# Patient Record
Sex: FEMALE | Race: WHITE | URBAN - METROPOLITAN AREA
[De-identification: names, ages, dates, MRNs, and addresses within clinical notes are randomized per-mention and may not be internally consistent; named-entity substitution may affect disease eponyms.]

---

## 2021-12-08 ENCOUNTER — APPOINTMENT (RX ONLY)
Dept: URBAN - METROPOLITAN AREA CLINIC 316 | Facility: CLINIC | Age: 76
Setting detail: DERMATOLOGY
End: 2021-12-08

## 2021-12-08 ENCOUNTER — RX ONLY (OUTPATIENT)
Age: 76
Setting detail: RX ONLY
End: 2021-12-08

## 2021-12-08 DIAGNOSIS — D485 NEOPLASM OF UNCERTAIN BEHAVIOR OF SKIN: ICD-10-CM | Status: WORSENING

## 2021-12-08 DIAGNOSIS — L57.8 OTHER SKIN CHANGES DUE TO CHRONIC EXPOSURE TO NONIONIZING RADIATION: ICD-10-CM | Status: WORSENING

## 2021-12-08 DIAGNOSIS — D22 MELANOCYTIC NEVI: ICD-10-CM

## 2021-12-08 DIAGNOSIS — L57.0 ACTINIC KERATOSIS: ICD-10-CM | Status: WORSENING

## 2021-12-08 DIAGNOSIS — L81.4 OTHER MELANIN HYPERPIGMENTATION: ICD-10-CM

## 2021-12-08 PROBLEM — D22.5 MELANOCYTIC NEVI OF TRUNK: Status: ACTIVE | Noted: 2021-12-08

## 2021-12-08 PROBLEM — D22.61 MELANOCYTIC NEVI OF RIGHT UPPER LIMB, INCLUDING SHOULDER: Status: ACTIVE | Noted: 2021-12-08

## 2021-12-08 PROBLEM — D48.5 NEOPLASM OF UNCERTAIN BEHAVIOR OF SKIN: Status: ACTIVE | Noted: 2021-12-08

## 2021-12-08 PROCEDURE — 99204 OFFICE O/P NEW MOD 45 MIN: CPT | Mod: 25

## 2021-12-08 PROCEDURE — ? BIOPSY BY SHAVE METHOD

## 2021-12-08 PROCEDURE — ? PRESCRIPTION

## 2021-12-08 PROCEDURE — ? FULL BODY SKIN EXAM - DECLINED

## 2021-12-08 PROCEDURE — ? LIQUID NITROGEN

## 2021-12-08 PROCEDURE — ? SCREENING FOR COVID-19

## 2021-12-08 PROCEDURE — ? COUNSELING

## 2021-12-08 PROCEDURE — ? SUNSCREEN RECOMMENDATIONS

## 2021-12-08 PROCEDURE — 17004 DESTROY PREMAL LESIONS 15/>: CPT

## 2021-12-08 PROCEDURE — 11102 TANGNTL BX SKIN SINGLE LES: CPT | Mod: 59

## 2021-12-08 RX ORDER — FLUOROURACIL 2 G/40G
5% CREAM TOPICAL BID
Qty: 40 | Refills: 3 | Status: ERX | COMMUNITY
Start: 2021-12-08

## 2021-12-08 RX ORDER — FLUOROURACIL 5 MG/G
5% CREAM TOPICAL BID
Qty: 40 | Refills: 2 | Status: CANCELLED | COMMUNITY
Start: 2021-12-08

## 2021-12-08 RX ADMIN — FLUOROURACIL 5%: 5 CREAM TOPICAL at 00:00

## 2021-12-08 ASSESSMENT — LOCATION DETAILED DESCRIPTION DERM
LOCATION DETAILED: RIGHT LATERAL DORSAL WRIST
LOCATION DETAILED: NASAL SUPRATIP
LOCATION DETAILED: RIGHT VENTRAL LATERAL DISTAL FOREARM
LOCATION DETAILED: LEFT PROXIMAL POSTERIOR UPPER ARM
LOCATION DETAILED: RIGHT PROXIMAL LATERAL DORSAL FOREARM
LOCATION DETAILED: LEFT DISTAL POSTERIOR UPPER ARM
LOCATION DETAILED: LEFT RADIAL DORSAL HAND
LOCATION DETAILED: RIGHT PROXIMAL DORSAL FOREARM
LOCATION DETAILED: UPPER STERNUM
LOCATION DETAILED: RIGHT CENTRAL MALAR CHEEK
LOCATION DETAILED: LEFT PROXIMAL DORSAL FOREARM
LOCATION DETAILED: LEFT CENTRAL MALAR CHEEK
LOCATION DETAILED: RIGHT PROXIMAL LATERAL POSTERIOR UPPER ARM
LOCATION DETAILED: RIGHT MEDIAL FOREHEAD
LOCATION DETAILED: RIGHT RADIAL DORSAL HAND
LOCATION DETAILED: RIGHT DISTAL DORSAL FOREARM
LOCATION DETAILED: 2ND WEB SPACE LEFT HAND
LOCATION DETAILED: LEFT ULNAR DORSAL HAND
LOCATION DETAILED: RIGHT DORSAL MIDDLE FINGER METACARPOPHALANGEAL JOINT
LOCATION DETAILED: RIGHT PROXIMAL POSTERIOR UPPER ARM

## 2021-12-08 ASSESSMENT — LOCATION ZONE DERM
LOCATION ZONE: FACE
LOCATION ZONE: NOSE
LOCATION ZONE: TRUNK
LOCATION ZONE: HAND
LOCATION ZONE: ARM

## 2021-12-08 ASSESSMENT — LOCATION SIMPLE DESCRIPTION DERM
LOCATION SIMPLE: RIGHT FOREHEAD
LOCATION SIMPLE: RIGHT WRIST
LOCATION SIMPLE: LEFT POSTERIOR UPPER ARM
LOCATION SIMPLE: LEFT FOREARM
LOCATION SIMPLE: NOSE
LOCATION SIMPLE: RIGHT CHEEK
LOCATION SIMPLE: RIGHT HAND
LOCATION SIMPLE: LEFT HAND
LOCATION SIMPLE: RIGHT POSTERIOR UPPER ARM
LOCATION SIMPLE: CHEST
LOCATION SIMPLE: LEFT CHEEK
LOCATION SIMPLE: RIGHT FOREARM

## 2021-12-08 NOTE — PROCEDURE: SUNSCREEN RECOMMENDATIONS
Products Recommended: Elta MD UV Clear
Detail Level: Detailed
General Sunscreen Counseling: I recommended a broad spectrum sunscreen with a SPF of 30 or higher.  I explained that SPF 30 sunscreens block approximately 97 percent of the sun's harmful rays.  Sunscreens should be applied at least 15 minutes prior to expected sun exposure and then every 2 hours after that as long as sun exposure continues. If swimming or exercising sunscreen should be reapplied every 45 minutes to an hour after getting wet or sweating.  One ounce, or the equivalent of a shot glass full of sunscreen, is adequate to protect the skin not covered by a bathing suit. I also recommended a lip balm with a sunscreen as well. Sun protective clothing can be used in lieu of sunscreen but must be worn the entire time you are exposed to the sun's rays.

## 2021-12-08 NOTE — PROCEDURE: BIOPSY BY SHAVE METHOD
Detail Level: Detailed
Depth Of Biopsy: dermis
Was A Bandage Applied: Yes
Size Of Lesion In Cm: 0.7
X Size Of Lesion In Cm: 0.8
Biopsy Type: H and E
Biopsy Method: Dermablade
Anesthesia Type: 2% lidocaine with epinephrine
Additional Anesthesia Volume In Cc (Will Not Render If 0): 0
Hemostasis: Electrocautery and Aluminum Chloride
Wound Care: Bacitracin
Dressing: bandage
Destruction After The Procedure: No
Type Of Destruction Used: Curettage
Curettage Text: The wound bed was treated with curettage after the biopsy was performed.
Cryotherapy Text: The wound bed was treated with cryotherapy after the biopsy was performed.
Electrodesiccation Text: The wound bed was treated with electrodesiccation after the biopsy was performed.
Electrodesiccation And Curettage Text: The wound bed was treated with electrodesiccation and curettage after the biopsy was performed.
Silver Nitrate Text: The wound bed was treated with silver nitrate after the biopsy was performed.
Lab: 6
Lab Facility: 3
Triangulation (Location Of Lesion In Relation To Distance From Anatomical Landmarks): 6.5 cm distal and 2cm med to R epicondyle
Consent: Verbal consent was obtained and risks were reviewed including but not limited to scarring, infection, bleeding, scabbing, incomplete removal, nerve damage and allergy to anesthesia.
Post-Care Instructions: I reviewed with the patient in detail post-care instructions. Patient is to keep the biopsy site dry overnight, and then apply bacitracin twice daily until healed. Patient may apply hydrogen peroxide soaks to remove any crusting.
Notification Instructions: Patient will be notified of biopsy results. However, patient instructed to call the office if not contacted within 2 weeks.
Billing Type: Third-Party Bill
Information: Selecting Yes will display possible errors in your note based on the variables you have selected. This validation is only offered as a suggestion for you. PLEASE NOTE THAT THE VALIDATION TEXT WILL BE REMOVED WHEN YOU FINALIZE YOUR NOTE. IF YOU WANT TO FAX A PRELIMINARY NOTE YOU WILL NEED TO TOGGLE THIS TO 'NO' IF YOU DO NOT WANT IT IN YOUR FAXED NOTE.

## 2021-12-08 NOTE — PROCEDURE: LIQUID NITROGEN
Detail Level: Detailed
Duration Of Freeze Thaw-Cycle (Seconds): 2
Post-Care Instructions: I reviewed with the patient in detail post-care instructions. Patient is to wear sunprotection, and avoid picking at any of the treated lesions. Pt may apply Vaseline to crusted or scabbing areas.
Consent: The patient's consent was obtained including but not limited to risks of crusting, scabbing, blistering, scarring, darker or lighter pigmentary change, recurrence, incomplete removal and infection.
Show Aperture Variable?: Yes
Render Note In Bullet Format When Appropriate: No
Number Of Freeze-Thaw Cycles: 2 freeze-thaw cycles

## 2021-12-08 NOTE — PROCEDURE: MIPS QUALITY
Quality 130: Documentation Of Current Medications In The Medical Record: Current Medications Documented
Detail Level: Detailed
Quality 47: Advance Care Plan: Advance Care Planning discussed and documented; advance care plan or surrogate decision maker documented in the medical record.
Quality 226: Preventive Care And Screening: Tobacco Use: Screening And Cessation Intervention: Patient screened for tobacco use and is an ex/non-smoker
Quality 111:Pneumonia Vaccination Status For Older Adults: Pneumococcal Vaccination not Administered or Previously Received, Reason not Otherwise Specified
Quality 431: Preventive Care And Screening: Unhealthy Alcohol Use - Screening: Patient screened for unhealthy alcohol use using a single question and scores less than 2 times per year
Quality 110: Preventive Care And Screening: Influenza Immunization: Influenza Immunization previously received during influenza season

## 2021-12-30 ENCOUNTER — APPOINTMENT (RX ONLY)
Dept: URBAN - METROPOLITAN AREA CLINIC 316 | Facility: CLINIC | Age: 76
Setting detail: DERMATOLOGY
End: 2021-12-30

## 2021-12-30 PROBLEM — D04.61 CARCINOMA IN SITU OF SKIN OF RIGHT UPPER LIMB, INCLUDING SHOULDER: Status: ACTIVE | Noted: 2021-12-30

## 2021-12-30 PROCEDURE — ? CURETTAGE AND DESTRUCTION

## 2021-12-30 PROCEDURE — 17262 DSTRJ MAL LES T/A/L 1.1-2.0: CPT

## 2021-12-30 PROCEDURE — ? SCREENING FOR COVID-19

## 2021-12-30 NOTE — PROCEDURE: MIPS QUALITY
Quality 130: Documentation Of Current Medications In The Medical Record: Current Medications Documented
Quality 110: Preventive Care And Screening: Influenza Immunization: Influenza Immunization previously received during influenza season
Quality 431: Preventive Care And Screening: Unhealthy Alcohol Use - Screening: Patient screened for unhealthy alcohol use using a single question and scores less than 2 times per year
Quality 226: Preventive Care And Screening: Tobacco Use: Screening And Cessation Intervention: Patient screened for tobacco use and is an ex/non-smoker
Quality 47: Advance Care Plan: Advance Care Planning discussed and documented; advance care plan or surrogate decision maker documented in the medical record.
Detail Level: Detailed
Quality 111:Pneumonia Vaccination Status For Older Adults: Pneumococcal Vaccination Previously Received

## 2022-02-11 ENCOUNTER — APPOINTMENT (RX ONLY)
Dept: URBAN - METROPOLITAN AREA CLINIC 316 | Facility: CLINIC | Age: 77
Setting detail: DERMATOLOGY
End: 2022-02-11

## 2022-02-11 DIAGNOSIS — Z48.817 ENCOUNTER FOR SURGICAL AFTERCARE FOLLOWING SURGERY ON THE SKIN AND SUBCUTANEOUS TISSUE: ICD-10-CM

## 2022-02-11 DIAGNOSIS — D22 MELANOCYTIC NEVI: ICD-10-CM

## 2022-02-11 DIAGNOSIS — L82.1 OTHER SEBORRHEIC KERATOSIS: ICD-10-CM

## 2022-02-11 DIAGNOSIS — L57.0 ACTINIC KERATOSIS: ICD-10-CM | Status: INADEQUATELY CONTROLLED

## 2022-02-11 PROBLEM — D22.5 MELANOCYTIC NEVI OF TRUNK: Status: ACTIVE | Noted: 2022-02-11

## 2022-02-11 PROCEDURE — ? ADDITIONAL NOTES

## 2022-02-11 PROCEDURE — ? SCREENING FOR COVID-19

## 2022-02-11 PROCEDURE — ? POST-OP WOUND CHECK

## 2022-02-11 PROCEDURE — 17000 DESTRUCT PREMALG LESION: CPT

## 2022-02-11 PROCEDURE — ? LIQUID NITROGEN

## 2022-02-11 PROCEDURE — 99213 OFFICE O/P EST LOW 20 MIN: CPT | Mod: 25

## 2022-02-11 PROCEDURE — 17003 DESTRUCT PREMALG LES 2-14: CPT

## 2022-02-11 PROCEDURE — 99024 POSTOP FOLLOW-UP VISIT: CPT

## 2022-02-11 PROCEDURE — ? COUNSELING

## 2022-02-11 PROCEDURE — ? NOTED ON EXAM BUT NOT TREATED

## 2022-02-11 ASSESSMENT — LOCATION ZONE DERM
LOCATION ZONE: ARM
LOCATION ZONE: HAND
LOCATION ZONE: TRUNK

## 2022-02-11 ASSESSMENT — LOCATION SIMPLE DESCRIPTION DERM
LOCATION SIMPLE: LEFT HAND
LOCATION SIMPLE: RIGHT UPPER BACK
LOCATION SIMPLE: RIGHT POSTERIOR UPPER ARM
LOCATION SIMPLE: LEFT UPPER BACK
LOCATION SIMPLE: RIGHT HAND
LOCATION SIMPLE: LEFT FOREARM
LOCATION SIMPLE: RIGHT FOREARM

## 2022-02-11 ASSESSMENT — LOCATION DETAILED DESCRIPTION DERM
LOCATION DETAILED: RIGHT DISTAL DORSAL FOREARM
LOCATION DETAILED: LEFT SUPERIOR UPPER BACK
LOCATION DETAILED: LEFT MID-UPPER BACK
LOCATION DETAILED: LEFT RADIAL DORSAL HAND
LOCATION DETAILED: LEFT DISTAL RADIAL DORSAL FOREARM
LOCATION DETAILED: LEFT DORSAL INDEX METACARPOPHALANGEAL JOINT
LOCATION DETAILED: LEFT DISTAL DORSAL FOREARM
LOCATION DETAILED: LEFT PROXIMAL RADIAL DORSAL FOREARM
LOCATION DETAILED: LEFT PROXIMAL DORSAL FOREARM
LOCATION DETAILED: RIGHT DISTAL POSTERIOR UPPER ARM
LOCATION DETAILED: RIGHT DISTAL RADIAL DORSAL FOREARM
LOCATION DETAILED: RIGHT RADIAL DORSAL HAND
LOCATION DETAILED: RIGHT PROXIMAL LATERAL DORSAL FOREARM
LOCATION DETAILED: RIGHT MID-UPPER BACK

## 2022-02-11 NOTE — PROCEDURE: NOTED ON EXAM BUT NOT TREATED
Detail Level: Zone
Text: The above diagnosis and findings were noted on the exam but patient does not want to treat at this time.

## 2022-02-11 NOTE — PROCEDURE: LIQUID NITROGEN
Post-Care Instructions: I reviewed with the patient in detail post-care instructions. Patient is to wear sunprotection, and avoid picking at any of the treated lesions. Pt may apply Vaseline to crusted or scabbing areas.
Consent: The patient's consent was obtained including but not limited to risks of crusting, scabbing, blistering, scarring, darker or lighter pigmentary change, recurrence, incomplete removal and infection.
Render Post-Care Instructions In Note?: yes
Duration Of Freeze Thaw-Cycle (Seconds): 2
Detail Level: Detailed
Number Of Freeze-Thaw Cycles: 2 freeze-thaw cycles
Render Note In Bullet Format When Appropriate: No

## 2022-02-11 NOTE — PROCEDURE: POST-OP WOUND CHECK
Detail Level: Detailed
Add 86723 Cpt? (Important Note: In 2017 The Use Of 34930 Is Being Tracked By Cms To Determine Future Global Period Reimbursement For Global Periods): yes
Wound Evaluated By: JAMES [email or web address]-C

## 2024-01-01 ENCOUNTER — HOSPITAL ENCOUNTER (OUTPATIENT)
Dept: RADIOLOGY | Facility: HOSPITAL | Age: 79
Discharge: HOME | End: 2024-01-23
Payer: MEDICARE

## 2024-01-01 ENCOUNTER — ANESTHESIA EVENT (OUTPATIENT)
Dept: GASTROENTEROLOGY | Facility: HOSPITAL | Age: 79
DRG: 166 | End: 2024-01-01
Payer: MEDICARE

## 2024-01-01 ENCOUNTER — APPOINTMENT (OUTPATIENT)
Dept: CARDIOLOGY | Facility: CLINIC | Age: 79
End: 2024-01-01
Payer: MEDICARE

## 2024-01-01 ENCOUNTER — HOSPITAL ENCOUNTER (OUTPATIENT)
Dept: RADIOLOGY | Facility: CLINIC | Age: 79
Discharge: HOME | End: 2024-02-05
Payer: MEDICARE

## 2024-01-01 ENCOUNTER — APPOINTMENT (OUTPATIENT)
Dept: RADIOLOGY | Facility: HOSPITAL | Age: 79
DRG: 166 | End: 2024-01-01
Payer: MEDICARE

## 2024-01-01 ENCOUNTER — HOSPITAL ENCOUNTER (OUTPATIENT)
Dept: CARDIOLOGY | Facility: HOSPITAL | Age: 79
Discharge: HOME | End: 2024-01-12
Payer: MEDICARE

## 2024-01-01 ENCOUNTER — APPOINTMENT (OUTPATIENT)
Dept: CARDIOLOGY | Facility: HOSPITAL | Age: 79
DRG: 166 | End: 2024-01-01
Payer: MEDICARE

## 2024-01-01 ENCOUNTER — OFFICE VISIT (OUTPATIENT)
Dept: HEMATOLOGY/ONCOLOGY | Facility: CLINIC | Age: 79
End: 2024-01-01
Payer: MEDICARE

## 2024-01-01 ENCOUNTER — APPOINTMENT (OUTPATIENT)
Dept: HEMATOLOGY/ONCOLOGY | Facility: CLINIC | Age: 79
End: 2024-01-01
Payer: MEDICARE

## 2024-01-01 ENCOUNTER — APPOINTMENT (OUTPATIENT)
Dept: GASTROENTEROLOGY | Facility: HOSPITAL | Age: 79
DRG: 166 | End: 2024-01-01
Payer: MEDICARE

## 2024-01-01 ENCOUNTER — ANESTHESIA (OUTPATIENT)
Dept: GASTROENTEROLOGY | Facility: HOSPITAL | Age: 79
DRG: 166 | End: 2024-01-01
Payer: MEDICARE

## 2024-01-01 ENCOUNTER — HOSPITAL ENCOUNTER (INPATIENT)
Facility: HOSPITAL | Age: 79
LOS: 3 days | Discharge: HOME | DRG: 166 | End: 2024-01-11
Attending: GENERAL PRACTICE | Admitting: STUDENT IN AN ORGANIZED HEALTH CARE EDUCATION/TRAINING PROGRAM
Payer: MEDICARE

## 2024-01-01 ENCOUNTER — TELEPHONE (OUTPATIENT)
Dept: HEMATOLOGY/ONCOLOGY | Facility: CLINIC | Age: 79
End: 2024-01-01
Payer: MEDICARE

## 2024-01-01 ENCOUNTER — SOCIAL WORK (OUTPATIENT)
Dept: HEMATOLOGY/ONCOLOGY | Facility: CLINIC | Age: 79
End: 2024-01-01
Payer: MEDICARE

## 2024-01-01 VITALS
HEART RATE: 74 BPM | WEIGHT: 148.15 LBS | DIASTOLIC BLOOD PRESSURE: 70 MMHG | RESPIRATION RATE: 18 BRPM | SYSTOLIC BLOOD PRESSURE: 137 MMHG | BODY MASS INDEX: 24.92 KG/M2 | OXYGEN SATURATION: 94 % | TEMPERATURE: 97.7 F

## 2024-01-01 VITALS
DIASTOLIC BLOOD PRESSURE: 61 MMHG | BODY MASS INDEX: 24.75 KG/M2 | HEIGHT: 66 IN | TEMPERATURE: 99 F | SYSTOLIC BLOOD PRESSURE: 128 MMHG | HEART RATE: 65 BPM | WEIGHT: 154 LBS | RESPIRATION RATE: 20 BRPM | OXYGEN SATURATION: 94 %

## 2024-01-01 VITALS
HEIGHT: 65 IN | SYSTOLIC BLOOD PRESSURE: 137 MMHG | HEART RATE: 68 BPM | WEIGHT: 150.35 LBS | TEMPERATURE: 98.1 F | BODY MASS INDEX: 25.05 KG/M2 | DIASTOLIC BLOOD PRESSURE: 79 MMHG | OXYGEN SATURATION: 95 % | RESPIRATION RATE: 18 BRPM

## 2024-01-01 VITALS
BODY MASS INDEX: 24.43 KG/M2 | WEIGHT: 152 LBS | RESPIRATION RATE: 18 BRPM | DIASTOLIC BLOOD PRESSURE: 59 MMHG | TEMPERATURE: 97.3 F | OXYGEN SATURATION: 96 % | HEIGHT: 66 IN | HEART RATE: 50 BPM | SYSTOLIC BLOOD PRESSURE: 105 MMHG

## 2024-01-01 DIAGNOSIS — C34.12 MALIGNANT NEOPLASM OF UPPER LOBE OF LEFT LUNG (MULTI): ICD-10-CM

## 2024-01-01 DIAGNOSIS — C79.70 MALIGNANT NEOPLASM METASTATIC TO ADRENAL GLAND, UNSPECIFIED LATERALITY (MULTI): ICD-10-CM

## 2024-01-01 DIAGNOSIS — C34.12 MALIGNANT NEOPLASM OF UPPER LOBE OF LEFT LUNG (MULTI): Primary | ICD-10-CM

## 2024-01-01 DIAGNOSIS — R91.8 LUNG MASS: ICD-10-CM

## 2024-01-01 DIAGNOSIS — C34.90 SMALL CELL LUNG CANCER (MULTI): Primary | ICD-10-CM

## 2024-01-01 DIAGNOSIS — R00.1 BRADYCARDIA: ICD-10-CM

## 2024-01-01 DIAGNOSIS — I10 BENIGN ESSENTIAL HYPERTENSION: ICD-10-CM

## 2024-01-01 DIAGNOSIS — R91.8 MASS OF LEFT LUNG: Primary | ICD-10-CM

## 2024-01-01 DIAGNOSIS — R55 SYNCOPE AND COLLAPSE: ICD-10-CM

## 2024-01-01 DIAGNOSIS — E78.2 MIXED HYPERLIPIDEMIA: ICD-10-CM

## 2024-01-01 DIAGNOSIS — R91.8 MASS OF LEFT LUNG: ICD-10-CM

## 2024-01-01 DIAGNOSIS — C34.90 SMALL CELL LUNG CANCER (MULTI): ICD-10-CM

## 2024-01-01 LAB
ALBUMIN SERPL BCP-MCNC: 3.4 G/DL (ref 3.4–5)
ALBUMIN SERPL BCP-MCNC: 3.5 G/DL (ref 3.4–5)
ALBUMIN SERPL BCP-MCNC: 3.6 G/DL (ref 3.4–5)
ALBUMIN SERPL BCP-MCNC: 3.6 G/DL (ref 3.4–5)
ALP SERPL-CCNC: 94 U/L (ref 33–136)
ALT SERPL W P-5'-P-CCNC: 8 U/L (ref 7–45)
ANION GAP SERPL CALC-SCNC: 10 MMOL/L (ref 10–20)
ANION GAP SERPL CALC-SCNC: 12 MMOL/L (ref 10–20)
ANION GAP SERPL CALC-SCNC: 12 MMOL/L (ref 10–20)
ANION GAP SERPL CALC-SCNC: 9 MMOL/L (ref 10–20)
AORTIC VALVE PEAK VELOCITY: 1.41
APAP SERPL-MCNC: <10 UG/ML
APPEARANCE UR: ABNORMAL
AST SERPL W P-5'-P-CCNC: 14 U/L (ref 9–39)
ATRIAL RATE: 55 BPM
ATRIAL RATE: 59 BPM
AV PEAK GRADIENT: 8
AVA (PEAK VEL): 3.19
BACTERIA #/AREA URNS AUTO: ABNORMAL /HPF
BACTERIA UR CULT: ABNORMAL
BASOPHILS # BLD AUTO: 0.01 X10*3/UL (ref 0–0.1)
BASOPHILS # BLD AUTO: 0.04 X10*3/UL (ref 0–0.1)
BASOPHILS # BLD AUTO: 0.05 X10*3/UL (ref 0–0.1)
BASOPHILS NFR BLD AUTO: 0.2 %
BASOPHILS NFR BLD AUTO: 0.5 %
BASOPHILS NFR BLD AUTO: 0.7 %
BILIRUB SERPL-MCNC: 0.5 MG/DL (ref 0–1.2)
BILIRUB UR STRIP.AUTO-MCNC: NEGATIVE MG/DL
BUN SERPL-MCNC: 10 MG/DL (ref 6–23)
BUN SERPL-MCNC: 11 MG/DL (ref 6–23)
CALCIUM SERPL-MCNC: 8.2 MG/DL (ref 8.6–10.3)
CALCIUM SERPL-MCNC: 8.4 MG/DL (ref 8.6–10.3)
CALCIUM SERPL-MCNC: 8.6 MG/DL (ref 8.6–10.3)
CALCIUM SERPL-MCNC: 8.7 MG/DL (ref 8.6–10.3)
CARDIAC TROPONIN I PNL SERPL HS: 4 NG/L (ref 0–13)
CARDIAC TROPONIN I PNL SERPL HS: 4 NG/L (ref 0–13)
CARDIAC TROPONIN I PNL SERPL HS: 5 NG/L (ref 0–13)
CHLORIDE SERPL-SCNC: 104 MMOL/L (ref 98–107)
CHLORIDE SERPL-SCNC: 106 MMOL/L (ref 98–107)
CHLORIDE SERPL-SCNC: 107 MMOL/L (ref 98–107)
CHLORIDE SERPL-SCNC: 110 MMOL/L (ref 98–107)
CO2 SERPL-SCNC: 25 MMOL/L (ref 21–32)
CO2 SERPL-SCNC: 28 MMOL/L (ref 21–32)
CO2 SERPL-SCNC: 28 MMOL/L (ref 21–32)
CO2 SERPL-SCNC: 31 MMOL/L (ref 21–32)
COLOR UR: YELLOW
CORTIS AM PEAK SERPL-MSCNC: 15.2 UG/DL (ref 5–20)
CORTIS AM PEAK SERPL-MSCNC: 17.4 UG/DL (ref 5–20)
CREAT SERPL-MCNC: 0.74 MG/DL (ref 0.5–1.05)
CREAT SERPL-MCNC: 0.78 MG/DL (ref 0.5–1.05)
CREAT SERPL-MCNC: 0.79 MG/DL (ref 0.5–1.05)
CREAT SERPL-MCNC: 0.86 MG/DL (ref 0.5–1.05)
CREAT UR-MCNC: 144.1 MG/DL (ref 20–320)
D DIMER PPP FEU-MCNC: 1247 NG/ML FEU
EGFRCR SERPLBLD CKD-EPI 2021: 70 ML/MIN/1.73M*2
EGFRCR SERPLBLD CKD-EPI 2021: 78 ML/MIN/1.73M*2
EGFRCR SERPLBLD CKD-EPI 2021: 79 ML/MIN/1.73M*2
EGFRCR SERPLBLD CKD-EPI 2021: 84 ML/MIN/1.73M*2
EJECTION FRACTION APICAL 4 CHAMBER: 74
EJECTION FRACTION: 69
EOSINOPHIL # BLD AUTO: 0 X10*3/UL (ref 0–0.4)
EOSINOPHIL # BLD AUTO: 0.18 X10*3/UL (ref 0–0.4)
EOSINOPHIL # BLD AUTO: 0.19 X10*3/UL (ref 0–0.4)
EOSINOPHIL NFR BLD AUTO: 0 %
EOSINOPHIL NFR BLD AUTO: 2.5 %
EOSINOPHIL NFR BLD AUTO: 2.7 %
ERYTHROCYTE [DISTWIDTH] IN BLOOD BY AUTOMATED COUNT: 14 % (ref 11.5–14.5)
ERYTHROCYTE [DISTWIDTH] IN BLOOD BY AUTOMATED COUNT: 14.1 % (ref 11.5–14.5)
ERYTHROCYTE [DISTWIDTH] IN BLOOD BY AUTOMATED COUNT: 14.4 % (ref 11.5–14.5)
ERYTHROCYTE [DISTWIDTH] IN BLOOD BY AUTOMATED COUNT: 14.4 % (ref 11.5–14.5)
ETHANOL SERPL-MCNC: <10 MG/DL
GLUCOSE SERPL-MCNC: 101 MG/DL (ref 74–99)
GLUCOSE SERPL-MCNC: 117 MG/DL (ref 74–99)
GLUCOSE SERPL-MCNC: 89 MG/DL (ref 74–99)
GLUCOSE SERPL-MCNC: 89 MG/DL (ref 74–99)
GLUCOSE UR STRIP.AUTO-MCNC: NEGATIVE MG/DL
HCT VFR BLD AUTO: 36.7 % (ref 36–46)
HCT VFR BLD AUTO: 37.6 % (ref 36–46)
HCT VFR BLD AUTO: 40.4 % (ref 36–46)
HCT VFR BLD AUTO: 40.6 % (ref 36–46)
HGB BLD-MCNC: 11.2 G/DL (ref 12–16)
HGB BLD-MCNC: 11.7 G/DL (ref 12–16)
HGB BLD-MCNC: 12.2 G/DL (ref 12–16)
HGB BLD-MCNC: 12.4 G/DL (ref 12–16)
HOLD SPECIMEN: NORMAL
IMM GRANULOCYTES # BLD AUTO: 0.03 X10*3/UL (ref 0–0.5)
IMM GRANULOCYTES # BLD AUTO: 0.03 X10*3/UL (ref 0–0.5)
IMM GRANULOCYTES # BLD AUTO: 0.05 X10*3/UL (ref 0–0.5)
IMM GRANULOCYTES NFR BLD AUTO: 0.4 % (ref 0–0.9)
IMM GRANULOCYTES NFR BLD AUTO: 0.5 % (ref 0–0.9)
IMM GRANULOCYTES NFR BLD AUTO: 0.7 % (ref 0–0.9)
INR PPP: 1 (ref 0.9–1.1)
INR PPP: 1.1 (ref 0.9–1.1)
KETONES UR STRIP.AUTO-MCNC: NEGATIVE MG/DL
LAB AP ASR DISCLAIMER: NORMAL
LABORATORY COMMENT REPORT: NORMAL
LEFT VENTRICLE INTERNAL DIMENSION DIASTOLE: 4 (ref 3.5–6)
LEFT VENTRICULAR OUTFLOW TRACT DIAMETER: 2.1
LEUKOCYTE ESTERASE UR QL STRIP.AUTO: ABNORMAL
LYMPHOCYTES # BLD AUTO: 0.97 X10*3/UL (ref 0.8–3)
LYMPHOCYTES # BLD AUTO: 1.55 X10*3/UL (ref 0.8–3)
LYMPHOCYTES # BLD AUTO: 1.58 X10*3/UL (ref 0.8–3)
LYMPHOCYTES NFR BLD AUTO: 14.7 %
LYMPHOCYTES NFR BLD AUTO: 20.6 %
LYMPHOCYTES NFR BLD AUTO: 22.8 %
MAGNESIUM SERPL-MCNC: 2.05 MG/DL (ref 1.6–2.4)
MAGNESIUM SERPL-MCNC: 2.07 MG/DL (ref 1.6–2.4)
MAGNESIUM SERPL-MCNC: 2.09 MG/DL (ref 1.6–2.4)
MCH RBC QN AUTO: 28 PG (ref 26–34)
MCH RBC QN AUTO: 28.1 PG (ref 26–34)
MCH RBC QN AUTO: 28.6 PG (ref 26–34)
MCH RBC QN AUTO: 28.7 PG (ref 26–34)
MCHC RBC AUTO-ENTMCNC: 30 G/DL (ref 32–36)
MCHC RBC AUTO-ENTMCNC: 30.5 G/DL (ref 32–36)
MCHC RBC AUTO-ENTMCNC: 30.7 G/DL (ref 32–36)
MCHC RBC AUTO-ENTMCNC: 31.1 G/DL (ref 32–36)
MCV RBC AUTO: 92 FL (ref 80–100)
MCV RBC AUTO: 96 FL (ref 80–100)
MITRAL VALVE E/A RATIO: 1.45
MITRAL VALVE E/E' RATIO: 15.47
MONOCYTES # BLD AUTO: 0.64 X10*3/UL (ref 0.05–0.8)
MONOCYTES # BLD AUTO: 0.65 X10*3/UL (ref 0.05–0.8)
MONOCYTES # BLD AUTO: 0.7 X10*3/UL (ref 0.05–0.8)
MONOCYTES NFR BLD AUTO: 9.1 %
MONOCYTES NFR BLD AUTO: 9.4 %
MONOCYTES NFR BLD AUTO: 9.9 %
NEUTROPHILS # BLD AUTO: 4.34 X10*3/UL (ref 1.6–5.5)
NEUTROPHILS # BLD AUTO: 4.93 X10*3/UL (ref 1.6–5.5)
NEUTROPHILS # BLD AUTO: 5.1 X10*3/UL (ref 1.6–5.5)
NEUTROPHILS NFR BLD AUTO: 64 %
NEUTROPHILS NFR BLD AUTO: 66.6 %
NEUTROPHILS NFR BLD AUTO: 74.7 %
NITRITE UR QL STRIP.AUTO: POSITIVE
NRBC BLD-RTO: 0 /100 WBCS (ref 0–0)
OSMOLALITY SERPL: 301 MOSM/KG (ref 280–300)
OSMOLALITY UR: 683 MOSM/KG (ref 200–1200)
P AXIS: 35 DEGREES
P AXIS: 43 DEGREES
P OFFSET: 209 MS
P OFFSET: 212 MS
P ONSET: 155 MS
P ONSET: 159 MS
PATH REPORT.COMMENTS IMP SPEC: NORMAL
PATH REPORT.COMMENTS IMP SPEC: NORMAL
PATH REPORT.FINAL DX SPEC: NORMAL
PATH REPORT.GROSS SPEC: NORMAL
PATH REPORT.RELEVANT HX SPEC: NORMAL
PATH REPORT.TOTAL CANCER: NORMAL
PH UR STRIP.AUTO: 6 [PH]
PHOSPHATE SERPL-MCNC: 3.4 MG/DL (ref 2.5–4.9)
PHOSPHATE SERPL-MCNC: 3.7 MG/DL (ref 2.5–4.9)
PHOSPHATE SERPL-MCNC: 3.8 MG/DL (ref 2.5–4.9)
PLATELET # BLD AUTO: 193 X10*3/UL (ref 150–450)
PLATELET # BLD AUTO: 194 X10*3/UL (ref 150–450)
PLATELET # BLD AUTO: 212 X10*3/UL (ref 150–450)
PLATELET # BLD AUTO: 230 X10*3/UL (ref 150–450)
POTASSIUM SERPL-SCNC: 3.5 MMOL/L (ref 3.5–5.3)
POTASSIUM SERPL-SCNC: 3.5 MMOL/L (ref 3.5–5.3)
POTASSIUM SERPL-SCNC: 3.7 MMOL/L (ref 3.5–5.3)
POTASSIUM SERPL-SCNC: 3.8 MMOL/L (ref 3.5–5.3)
PR INTERVAL: 126 MS
PR INTERVAL: 130 MS
PROT SERPL-MCNC: 6.3 G/DL (ref 6.4–8.2)
PROT UR STRIP.AUTO-MCNC: ABNORMAL MG/DL
PROTHROMBIN TIME: 11.3 SECONDS (ref 9.8–12.8)
PROTHROMBIN TIME: 12.2 SECONDS (ref 9.8–12.8)
Q ONSET: 220 MS
Q ONSET: 222 MS
QRS COUNT: 9 BEATS
QRS COUNT: 9 BEATS
QRS DURATION: 74 MS
QRS DURATION: 88 MS
QT INTERVAL: 464 MS
QT INTERVAL: 476 MS
QTC CALCULATION(BAZETT): 455 MS
QTC CALCULATION(BAZETT): 459 MS
QTC FREDERICIA: 461 MS
QTC FREDERICIA: 462 MS
R AXIS: 40 DEGREES
R AXIS: 45 DEGREES
RBC # BLD AUTO: 4 X10*6/UL (ref 4–5.2)
RBC # BLD AUTO: 4.09 X10*6/UL (ref 4–5.2)
RBC # BLD AUTO: 4.25 X10*6/UL (ref 4–5.2)
RBC # BLD AUTO: 4.41 X10*6/UL (ref 4–5.2)
RBC # UR STRIP.AUTO: NEGATIVE /UL
RBC #/AREA URNS AUTO: ABNORMAL /HPF
RIGHT VENTRICLE FREE WALL PEAK S': 11.3
RIGHT VENTRICLE PEAK SYSTOLIC PRESSURE: 26.8
SALICYLATES SERPL-MCNC: <3 MG/DL
SODIUM SERPL-SCNC: 140 MMOL/L (ref 136–145)
SODIUM SERPL-SCNC: 140 MMOL/L (ref 136–145)
SODIUM SERPL-SCNC: 143 MMOL/L (ref 136–145)
SODIUM SERPL-SCNC: 143 MMOL/L (ref 136–145)
SODIUM UR-SCNC: 115 MMOL/L
SODIUM/CREAT UR-RTO: 80 MMOL/G CREAT
SP GR UR STRIP.AUTO: 1.05
SQUAMOUS #/AREA URNS AUTO: ABNORMAL /HPF
T AXIS: -2 DEGREES
T AXIS: 17 DEGREES
T OFFSET: 454 MS
T OFFSET: 458 MS
TRICUSPID ANNULAR PLANE SYSTOLIC EXCURSION: 1.8
TSH SERPL-ACNC: 0.88 MIU/L (ref 0.44–3.98)
UROBILINOGEN UR STRIP.AUTO-MCNC: <2 MG/DL
VENTRICULAR RATE: 55 BPM
VENTRICULAR RATE: 59 BPM
WBC # BLD AUTO: 6.6 X10*3/UL (ref 4.4–11.3)
WBC # BLD AUTO: 6.8 X10*3/UL (ref 4.4–11.3)
WBC # BLD AUTO: 7.5 X10*3/UL (ref 4.4–11.3)
WBC # BLD AUTO: 7.7 X10*3/UL (ref 4.4–11.3)
WBC #/AREA URNS AUTO: ABNORMAL /HPF

## 2024-01-01 PROCEDURE — 99223 1ST HOSP IP/OBS HIGH 75: CPT | Performed by: INTERNAL MEDICINE

## 2024-01-01 PROCEDURE — 36415 COLL VENOUS BLD VENIPUNCTURE: CPT | Performed by: RADIOLOGY

## 2024-01-01 PROCEDURE — 97161 PT EVAL LOW COMPLEX 20 MIN: CPT | Mod: GP | Performed by: PHYSICAL THERAPIST

## 2024-01-01 PROCEDURE — 84484 ASSAY OF TROPONIN QUANT: CPT

## 2024-01-01 PROCEDURE — 1126F AMNT PAIN NOTED NONE PRSNT: CPT | Performed by: INTERNAL MEDICINE

## 2024-01-01 PROCEDURE — 2550000001 HC RX 255 CONTRASTS

## 2024-01-01 PROCEDURE — A31625 PR BRONCHOSCOPY,BIOPSY: Performed by: STUDENT IN AN ORGANIZED HEALTH CARE EDUCATION/TRAINING PROGRAM

## 2024-01-01 PROCEDURE — 0B9G8ZX DRAINAGE OF LEFT UPPER LUNG LOBE, VIA NATURAL OR ARTIFICIAL OPENING ENDOSCOPIC, DIAGNOSTIC: ICD-10-PCS | Performed by: INTERNAL MEDICINE

## 2024-01-01 PROCEDURE — 2550000001 HC RX 255 CONTRASTS: Performed by: GENERAL PRACTICE

## 2024-01-01 PROCEDURE — 72125 CT NECK SPINE W/O DYE: CPT

## 2024-01-01 PROCEDURE — 88112 CYTOPATH CELL ENHANCE TECH: CPT | Performed by: PATHOLOGY

## 2024-01-01 PROCEDURE — 99205 OFFICE O/P NEW HI 60 MIN: CPT | Performed by: INTERNAL MEDICINE

## 2024-01-01 PROCEDURE — 88305 TISSUE EXAM BY PATHOLOGIST: CPT | Mod: TC,SUR,STJLAB | Performed by: STUDENT IN AN ORGANIZED HEALTH CARE EDUCATION/TRAINING PROGRAM

## 2024-01-01 PROCEDURE — 2500000004 HC RX 250 GENERAL PHARMACY W/ HCPCS (ALT 636 FOR OP/ED)

## 2024-01-01 PROCEDURE — 93005 ELECTROCARDIOGRAM TRACING: CPT

## 2024-01-01 PROCEDURE — 80069 RENAL FUNCTION PANEL: CPT

## 2024-01-01 PROCEDURE — 99222 1ST HOSP IP/OBS MODERATE 55: CPT | Performed by: INTERNAL MEDICINE

## 2024-01-01 PROCEDURE — 1111F DSCHRG MED/CURRENT MED MERGE: CPT | Performed by: INTERNAL MEDICINE

## 2024-01-01 PROCEDURE — A9575 INJ GADOTERATE MEGLUMI 0.1ML: HCPCS

## 2024-01-01 PROCEDURE — 99285 EMERGENCY DEPT VISIT HI MDM: CPT | Mod: 25 | Performed by: GENERAL PRACTICE

## 2024-01-01 PROCEDURE — 88305 TISSUE EXAM BY PATHOLOGIST: CPT | Mod: TC,SUR,STJLAB | Performed by: INTERNAL MEDICINE

## 2024-01-01 PROCEDURE — 2500000001 HC RX 250 WO HCPCS SELF ADMINISTERED DRUGS (ALT 637 FOR MEDICARE OP)

## 2024-01-01 PROCEDURE — 7100000001 HC RECOVERY ROOM TIME - INITIAL BASE CHARGE

## 2024-01-01 PROCEDURE — 74177 CT ABD & PELVIS W/CONTRAST: CPT

## 2024-01-01 PROCEDURE — 83935 ASSAY OF URINE OSMOLALITY: CPT | Mod: STJLAB

## 2024-01-01 PROCEDURE — 7100000002 HC RECOVERY ROOM TIME - EACH INCREMENTAL 1 MINUTE

## 2024-01-01 PROCEDURE — 3700000002 HC GENERAL ANESTHESIA TIME - EACH INCREMENTAL 1 MINUTE

## 2024-01-01 PROCEDURE — 83735 ASSAY OF MAGNESIUM: CPT

## 2024-01-01 PROCEDURE — 36415 COLL VENOUS BLD VENIPUNCTURE: CPT

## 2024-01-01 PROCEDURE — 2500000004 HC RX 250 GENERAL PHARMACY W/ HCPCS (ALT 636 FOR OP/ED): Performed by: RADIOLOGY

## 2024-01-01 PROCEDURE — 84443 ASSAY THYROID STIM HORMONE: CPT | Performed by: STUDENT IN AN ORGANIZED HEALTH CARE EDUCATION/TRAINING PROGRAM

## 2024-01-01 PROCEDURE — 71045 X-RAY EXAM CHEST 1 VIEW: CPT | Performed by: RADIOLOGY

## 2024-01-01 PROCEDURE — 99223 1ST HOSP IP/OBS HIGH 75: CPT | Performed by: STUDENT IN AN ORGANIZED HEALTH CARE EDUCATION/TRAINING PROGRAM

## 2024-01-01 PROCEDURE — 49180 BIOPSY ABDOMINAL MASS: CPT | Performed by: RADIOLOGY

## 2024-01-01 PROCEDURE — 99232 SBSQ HOSP IP/OBS MODERATE 35: CPT | Performed by: INTERNAL MEDICINE

## 2024-01-01 PROCEDURE — 88112 CYTOPATH CELL ENHANCE TECH: CPT | Mod: TC | Performed by: INTERNAL MEDICINE

## 2024-01-01 PROCEDURE — 71275 CT ANGIOGRAPHY CHEST: CPT

## 2024-01-01 PROCEDURE — 71046 X-RAY EXAM CHEST 2 VIEWS: CPT | Performed by: RADIOLOGY

## 2024-01-01 PROCEDURE — 85025 COMPLETE CBC W/AUTO DIFF WBC: CPT

## 2024-01-01 PROCEDURE — 77012 CT SCAN FOR NEEDLE BIOPSY: CPT | Performed by: RADIOLOGY

## 2024-01-01 PROCEDURE — 1157F ADVNC CARE PLAN IN RCRD: CPT | Performed by: INTERNAL MEDICINE

## 2024-01-01 PROCEDURE — 82570 ASSAY OF URINE CREATININE: CPT

## 2024-01-01 PROCEDURE — 2550000001 HC RX 255 CONTRASTS: Performed by: STUDENT IN AN ORGANIZED HEALTH CARE EDUCATION/TRAINING PROGRAM

## 2024-01-01 PROCEDURE — 70553 MRI BRAIN STEM W/O & W/DYE: CPT

## 2024-01-01 PROCEDURE — 1200000002 HC GENERAL ROOM WITH TELEMETRY DAILY

## 2024-01-01 PROCEDURE — 82533 TOTAL CORTISOL: CPT | Mod: STJLAB | Performed by: STUDENT IN AN ORGANIZED HEALTH CARE EDUCATION/TRAINING PROGRAM

## 2024-01-01 PROCEDURE — G0390 TRAUMA RESPONS W/HOSP CRITI: HCPCS

## 2024-01-01 PROCEDURE — 88341 IMHCHEM/IMCYTCHM EA ADD ANTB: CPT | Performed by: STUDENT IN AN ORGANIZED HEALTH CARE EDUCATION/TRAINING PROGRAM

## 2024-01-01 PROCEDURE — 1159F MED LIST DOCD IN RCRD: CPT | Performed by: INTERNAL MEDICINE

## 2024-01-01 PROCEDURE — 3430000001 HC RX 343 DIAGNOSTIC RADIOPHARMACEUTICALS: Mod: MUE | Performed by: INTERNAL MEDICINE

## 2024-01-01 PROCEDURE — 71275 CT ANGIOGRAPHY CHEST: CPT | Performed by: RADIOLOGY

## 2024-01-01 PROCEDURE — 3075F SYST BP GE 130 - 139MM HG: CPT | Performed by: INTERNAL MEDICINE

## 2024-01-01 PROCEDURE — A9552 F18 FDG: HCPCS | Mod: MUE | Performed by: INTERNAL MEDICINE

## 2024-01-01 PROCEDURE — 71045 X-RAY EXAM CHEST 1 VIEW: CPT

## 2024-01-01 PROCEDURE — 93246 EXT ECG>7D<15D RECORDING: CPT

## 2024-01-01 PROCEDURE — 85379 FIBRIN DEGRADATION QUANT: CPT

## 2024-01-01 PROCEDURE — 3078F DIAST BP <80 MM HG: CPT | Performed by: INTERNAL MEDICINE

## 2024-01-01 PROCEDURE — A31625 PR BRONCHOSCOPY,BIOPSY: Performed by: NURSE ANESTHETIST, CERTIFIED REGISTERED

## 2024-01-01 PROCEDURE — 77012 CT SCAN FOR NEEDLE BIOPSY: CPT

## 2024-01-01 PROCEDURE — 85027 COMPLETE CBC AUTOMATED: CPT

## 2024-01-01 PROCEDURE — 74177 CT ABD & PELVIS W/CONTRAST: CPT | Performed by: RADIOLOGY

## 2024-01-01 PROCEDURE — 85610 PROTHROMBIN TIME: CPT

## 2024-01-01 PROCEDURE — 99152 MOD SED SAME PHYS/QHP 5/>YRS: CPT | Performed by: RADIOLOGY

## 2024-01-01 PROCEDURE — 87086 URINE CULTURE/COLONY COUNT: CPT | Mod: STJLAB

## 2024-01-01 PROCEDURE — 80053 COMPREHEN METABOLIC PANEL: CPT

## 2024-01-01 PROCEDURE — 85610 PROTHROMBIN TIME: CPT | Performed by: RADIOLOGY

## 2024-01-01 PROCEDURE — 99233 SBSQ HOSP IP/OBS HIGH 50: CPT | Performed by: INTERNAL MEDICINE

## 2024-01-01 PROCEDURE — 99214 OFFICE O/P EST MOD 30 MIN: CPT | Performed by: INTERNAL MEDICINE

## 2024-01-01 PROCEDURE — 88305 TISSUE EXAM BY PATHOLOGIST: CPT | Performed by: PATHOLOGY

## 2024-01-01 PROCEDURE — 1100000001 HC PRIVATE ROOM DAILY

## 2024-01-01 PROCEDURE — 99152 MOD SED SAME PHYS/QHP 5/>YRS: CPT

## 2024-01-01 PROCEDURE — 2500000004 HC RX 250 GENERAL PHARMACY W/ HCPCS (ALT 636 FOR OP/ED): Performed by: NURSE ANESTHETIST, CERTIFIED REGISTERED

## 2024-01-01 PROCEDURE — 31625 BRONCHOSCOPY W/BIOPSY(S): CPT | Performed by: INTERNAL MEDICINE

## 2024-01-01 PROCEDURE — 99233 SBSQ HOSP IP/OBS HIGH 50: CPT | Performed by: STUDENT IN AN ORGANIZED HEALTH CARE EDUCATION/TRAINING PROGRAM

## 2024-01-01 PROCEDURE — 88342 IMHCHEM/IMCYTCHM 1ST ANTB: CPT | Performed by: STUDENT IN AN ORGANIZED HEALTH CARE EDUCATION/TRAINING PROGRAM

## 2024-01-01 PROCEDURE — 82533 TOTAL CORTISOL: CPT | Mod: STJLAB

## 2024-01-01 PROCEDURE — 83930 ASSAY OF BLOOD OSMOLALITY: CPT | Mod: STJLAB

## 2024-01-01 PROCEDURE — 99239 HOSP IP/OBS DSCHRG MGMT >30: CPT | Performed by: STUDENT IN AN ORGANIZED HEALTH CARE EDUCATION/TRAINING PROGRAM

## 2024-01-01 PROCEDURE — 80143 DRUG ASSAY ACETAMINOPHEN: CPT

## 2024-01-01 PROCEDURE — 70450 CT HEAD/BRAIN W/O DYE: CPT | Performed by: RADIOLOGY

## 2024-01-01 PROCEDURE — 99100 ANES PT EXTEME AGE<1 YR&>70: CPT | Performed by: STUDENT IN AN ORGANIZED HEALTH CARE EDUCATION/TRAINING PROGRAM

## 2024-01-01 PROCEDURE — 71046 X-RAY EXAM CHEST 2 VIEWS: CPT

## 2024-01-01 PROCEDURE — 0BBG8ZX EXCISION OF LEFT UPPER LUNG LOBE, VIA NATURAL OR ARTIFICIAL OPENING ENDOSCOPIC, DIAGNOSTIC: ICD-10-PCS | Performed by: INTERNAL MEDICINE

## 2024-01-01 PROCEDURE — 2500000004 HC RX 250 GENERAL PHARMACY W/ HCPCS (ALT 636 FOR OP/ED): Performed by: INTERNAL MEDICINE

## 2024-01-01 PROCEDURE — 88305 TISSUE EXAM BY PATHOLOGIST: CPT | Performed by: STUDENT IN AN ORGANIZED HEALTH CARE EDUCATION/TRAINING PROGRAM

## 2024-01-01 PROCEDURE — 2720000007 HC OR 272 NO HCPCS

## 2024-01-01 PROCEDURE — 93306 TTE W/DOPPLER COMPLETE: CPT

## 2024-01-01 PROCEDURE — 2500000005 HC RX 250 GENERAL PHARMACY W/O HCPCS: Performed by: NURSE ANESTHETIST, CERTIFIED REGISTERED

## 2024-01-01 PROCEDURE — 81001 URINALYSIS AUTO W/SCOPE: CPT

## 2024-01-01 PROCEDURE — 99215 OFFICE O/P EST HI 40 MIN: CPT | Performed by: INTERNAL MEDICINE

## 2024-01-01 PROCEDURE — 72125 CT NECK SPINE W/O DYE: CPT | Performed by: RADIOLOGY

## 2024-01-01 PROCEDURE — 3700000001 HC GENERAL ANESTHESIA TIME - INITIAL BASE CHARGE

## 2024-01-01 PROCEDURE — 78816 PET IMAGE W/CT FULL BODY: CPT | Mod: PI

## 2024-01-01 PROCEDURE — 70450 CT HEAD/BRAIN W/O DYE: CPT

## 2024-01-01 PROCEDURE — 93248 EXT ECG>7D<15D REV&INTERPJ: CPT | Performed by: STUDENT IN AN ORGANIZED HEALTH CARE EDUCATION/TRAINING PROGRAM

## 2024-01-01 RX ORDER — ONDANSETRON HYDROCHLORIDE 2 MG/ML
INJECTION, SOLUTION INTRAVENOUS AS NEEDED
Status: DISCONTINUED | OUTPATIENT
Start: 2024-01-01 | End: 2024-01-01

## 2024-01-01 RX ORDER — LABETALOL HYDROCHLORIDE 5 MG/ML
5 INJECTION, SOLUTION INTRAVENOUS ONCE AS NEEDED
Status: DISCONTINUED | OUTPATIENT
Start: 2024-01-01 | End: 2024-01-01

## 2024-01-01 RX ORDER — ONDANSETRON HYDROCHLORIDE 2 MG/ML
4 INJECTION, SOLUTION INTRAVENOUS ONCE AS NEEDED
Status: DISCONTINUED | OUTPATIENT
Start: 2024-01-01 | End: 2024-01-01

## 2024-01-01 RX ORDER — FAMOTIDINE 10 MG/ML
20 INJECTION INTRAVENOUS ONCE AS NEEDED
Status: CANCELLED | OUTPATIENT
Start: 2024-01-01

## 2024-01-01 RX ORDER — FAMOTIDINE 20 MG/1
20 TABLET, FILM COATED ORAL DAILY
COMMUNITY

## 2024-01-01 RX ORDER — OXYCODONE HYDROCHLORIDE 5 MG/1
5 TABLET ORAL EVERY 4 HOURS PRN
Qty: 10 TABLET | Refills: 0 | Status: SHIPPED | OUTPATIENT
Start: 2024-01-01 | End: 2024-01-01

## 2024-01-01 RX ORDER — PROPOFOL 10 MG/ML
INJECTION, EMULSION INTRAVENOUS AS NEEDED
Status: DISCONTINUED | OUTPATIENT
Start: 2024-01-01 | End: 2024-01-01

## 2024-01-01 RX ORDER — ALBUTEROL SULFATE 0.83 MG/ML
2.5 SOLUTION RESPIRATORY (INHALATION) ONCE AS NEEDED
Status: DISCONTINUED | OUTPATIENT
Start: 2024-01-01 | End: 2024-01-01

## 2024-01-01 RX ORDER — LEVOTHYROXINE SODIUM 50 UG/1
75 TABLET ORAL
COMMUNITY

## 2024-01-01 RX ORDER — DIPHENHYDRAMINE HYDROCHLORIDE 50 MG/ML
50 INJECTION INTRAMUSCULAR; INTRAVENOUS AS NEEDED
Status: CANCELLED | OUTPATIENT
Start: 2024-01-01

## 2024-01-01 RX ORDER — ATORVASTATIN CALCIUM 80 MG/1
80 TABLET, FILM COATED ORAL DAILY
Status: DISCONTINUED | OUTPATIENT
Start: 2024-01-01 | End: 2024-01-01 | Stop reason: HOSPADM

## 2024-01-01 RX ORDER — VIT A/VIT C/VIT E/ZINC/COPPER 4296-226
2 CAPSULE ORAL 2 TIMES DAILY
COMMUNITY

## 2024-01-01 RX ORDER — PROCHLORPERAZINE MALEATE 10 MG
10 TABLET ORAL EVERY 6 HOURS PRN
Status: CANCELLED | OUTPATIENT
Start: 2024-01-01

## 2024-01-01 RX ORDER — DEXAMETHASONE SODIUM PHOSPHATE 4 MG/ML
8 INJECTION, SOLUTION INTRA-ARTICULAR; INTRALESIONAL; INTRAMUSCULAR; INTRAVENOUS; SOFT TISSUE ONCE
Status: CANCELLED | OUTPATIENT
Start: 2024-01-01

## 2024-01-01 RX ORDER — ASPIRIN 81 MG/1
81 TABLET ORAL DAILY
COMMUNITY
End: 2024-01-01 | Stop reason: HOSPADM

## 2024-01-01 RX ORDER — PROPOFOL 10 MG/ML
INJECTION, EMULSION INTRAVENOUS CONTINUOUS PRN
Status: DISCONTINUED | OUTPATIENT
Start: 2024-01-01 | End: 2024-01-01

## 2024-01-01 RX ORDER — ALBUTEROL SULFATE 0.83 MG/ML
3 SOLUTION RESPIRATORY (INHALATION) AS NEEDED
Status: CANCELLED | OUTPATIENT
Start: 2024-01-01

## 2024-01-01 RX ORDER — ENOXAPARIN SODIUM 100 MG/ML
40 INJECTION SUBCUTANEOUS EVERY 24 HOURS
Status: DISCONTINUED | OUTPATIENT
Start: 2024-01-01 | End: 2024-01-01 | Stop reason: HOSPADM

## 2024-01-01 RX ORDER — ACETAMINOPHEN 325 MG/1
650 TABLET ORAL ONCE
Status: COMPLETED | OUTPATIENT
Start: 2024-01-01 | End: 2024-01-01

## 2024-01-01 RX ORDER — MEPERIDINE HYDROCHLORIDE 50 MG/ML
12.5 INJECTION INTRAMUSCULAR; INTRAVENOUS; SUBCUTANEOUS EVERY 10 MIN PRN
Status: DISCONTINUED | OUTPATIENT
Start: 2024-01-01 | End: 2024-01-01

## 2024-01-01 RX ORDER — EPINEPHRINE 0.1 MG/ML
INJECTION INTRACARDIAC; INTRAVENOUS AS NEEDED
Status: COMPLETED | OUTPATIENT
Start: 2024-01-01 | End: 2024-01-01

## 2024-01-01 RX ORDER — LORAZEPAM 0.5 MG/1
0.5 TABLET ORAL DAILY PRN
COMMUNITY
Start: 2021-12-10

## 2024-01-01 RX ORDER — LISINOPRIL 2.5 MG/1
1 TABLET ORAL DAILY
COMMUNITY
End: 2024-01-01

## 2024-01-01 RX ORDER — PALONOSETRON 0.05 MG/ML
0.25 INJECTION, SOLUTION INTRAVENOUS ONCE
Status: CANCELLED | OUTPATIENT
Start: 2024-01-01

## 2024-01-01 RX ORDER — GLYCOPYRROLATE 0.2 MG/ML
INJECTION INTRAMUSCULAR; INTRAVENOUS AS NEEDED
Status: DISCONTINUED | OUTPATIENT
Start: 2024-01-01 | End: 2024-01-01

## 2024-01-01 RX ORDER — PROCHLORPERAZINE EDISYLATE 5 MG/ML
10 INJECTION INTRAMUSCULAR; INTRAVENOUS EVERY 6 HOURS PRN
Status: CANCELLED | OUTPATIENT
Start: 2024-01-01

## 2024-01-01 RX ORDER — LEVOTHYROXINE SODIUM 75 UG/1
75 TABLET ORAL
Status: DISCONTINUED | OUTPATIENT
Start: 2024-01-01 | End: 2024-01-01 | Stop reason: HOSPADM

## 2024-01-01 RX ORDER — CHOLECALCIFEROL (VITAMIN D3) 25 MCG
1000 TABLET ORAL DAILY
Status: DISCONTINUED | OUTPATIENT
Start: 2024-01-01 | End: 2024-01-01 | Stop reason: HOSPADM

## 2024-01-01 RX ORDER — FENTANYL CITRATE 50 UG/ML
INJECTION, SOLUTION INTRAMUSCULAR; INTRAVENOUS AS NEEDED
Status: DISCONTINUED | OUTPATIENT
Start: 2024-01-01 | End: 2024-01-01

## 2024-01-01 RX ORDER — OXYCODONE HYDROCHLORIDE 5 MG/1
5 TABLET ORAL EVERY 4 HOURS PRN
Status: DISCONTINUED | OUTPATIENT
Start: 2024-01-01 | End: 2024-01-01

## 2024-01-01 RX ORDER — LISINOPRIL 2.5 MG/1
2.5 TABLET ORAL DAILY
Qty: 90 TABLET | Refills: 3 | Status: SHIPPED | OUTPATIENT
Start: 2024-01-01 | End: 2024-03-06

## 2024-01-01 RX ORDER — FLUDEOXYGLUCOSE F 18 200 MCI/ML
10.8 INJECTION, SOLUTION INTRAVENOUS
Status: COMPLETED | OUTPATIENT
Start: 2024-01-01 | End: 2024-01-01

## 2024-01-01 RX ORDER — NITROGLYCERIN 0.4 MG/1
0.4 TABLET SUBLINGUAL EVERY 5 MIN PRN
COMMUNITY
Start: 2022-03-07

## 2024-01-01 RX ORDER — ONDANSETRON HYDROCHLORIDE 2 MG/ML
8 INJECTION, SOLUTION INTRAVENOUS ONCE
Status: CANCELLED | OUTPATIENT
Start: 2024-01-01

## 2024-01-01 RX ORDER — LISINOPRIL 2.5 MG/1
2.5 TABLET ORAL DAILY
Status: DISCONTINUED | OUTPATIENT
Start: 2024-01-01 | End: 2024-01-01 | Stop reason: HOSPADM

## 2024-01-01 RX ORDER — MIDAZOLAM HYDROCHLORIDE 1 MG/ML
INJECTION, SOLUTION INTRAMUSCULAR; INTRAVENOUS
Status: COMPLETED | OUTPATIENT
Start: 2024-01-01 | End: 2024-01-01

## 2024-01-01 RX ORDER — SODIUM CHLORIDE 9 MG/ML
50 INJECTION, SOLUTION INTRAVENOUS CONTINUOUS
Status: DISCONTINUED | OUTPATIENT
Start: 2024-01-01 | End: 2024-01-01 | Stop reason: HOSPADM

## 2024-01-01 RX ORDER — VIT C/E/ZN/COPPR/LUTEIN/ZEAXAN 250MG-90MG
1 CAPSULE ORAL DAILY
COMMUNITY

## 2024-01-01 RX ORDER — SODIUM CHLORIDE, SODIUM LACTATE, POTASSIUM CHLORIDE, CALCIUM CHLORIDE 600; 310; 30; 20 MG/100ML; MG/100ML; MG/100ML; MG/100ML
100 INJECTION, SOLUTION INTRAVENOUS CONTINUOUS
Status: DISCONTINUED | OUTPATIENT
Start: 2024-01-01 | End: 2024-01-01

## 2024-01-01 RX ORDER — GADOTERATE MEGLUMINE 376.9 MG/ML
14 INJECTION INTRAVENOUS
Status: COMPLETED | OUTPATIENT
Start: 2024-01-01 | End: 2024-01-01

## 2024-01-01 RX ORDER — LIDOCAINE HYDROCHLORIDE 20 MG/ML
INJECTION, SOLUTION EPIDURAL; INFILTRATION; INTRACAUDAL; PERINEURAL AS NEEDED
Status: DISCONTINUED | OUTPATIENT
Start: 2024-01-01 | End: 2024-01-01

## 2024-01-01 RX ORDER — EPINEPHRINE 0.3 MG/.3ML
0.3 INJECTION SUBCUTANEOUS EVERY 5 MIN PRN
Status: CANCELLED | OUTPATIENT
Start: 2024-01-01

## 2024-01-01 RX ORDER — ESCITALOPRAM OXALATE 20 MG/1
20 TABLET ORAL DAILY
COMMUNITY

## 2024-01-01 RX ORDER — ATORVASTATIN CALCIUM 80 MG/1
80 TABLET, FILM COATED ORAL DAILY
Qty: 90 TABLET | Refills: 3 | Status: SHIPPED | OUTPATIENT
Start: 2024-01-01 | End: 2024-03-06

## 2024-01-01 RX ORDER — NITROGLYCERIN 0.4 MG/1
0.4 TABLET SUBLINGUAL EVERY 5 MIN PRN
Status: DISCONTINUED | OUTPATIENT
Start: 2024-01-01 | End: 2024-01-01 | Stop reason: HOSPADM

## 2024-01-01 RX ORDER — SODIUM CHLORIDE, SODIUM LACTATE, POTASSIUM CHLORIDE, CALCIUM CHLORIDE 600; 310; 30; 20 MG/100ML; MG/100ML; MG/100ML; MG/100ML
INJECTION, SOLUTION INTRAVENOUS CONTINUOUS PRN
Status: DISCONTINUED | OUTPATIENT
Start: 2024-01-01 | End: 2024-01-01

## 2024-01-01 RX ORDER — POLYETHYLENE GLYCOL 3350 17 G/17G
17 POWDER, FOR SOLUTION ORAL DAILY
Status: DISCONTINUED | OUTPATIENT
Start: 2024-01-01 | End: 2024-01-01 | Stop reason: HOSPADM

## 2024-01-01 RX ORDER — FENTANYL CITRATE 50 UG/ML
25 INJECTION, SOLUTION INTRAMUSCULAR; INTRAVENOUS EVERY 5 MIN PRN
Status: DISCONTINUED | OUTPATIENT
Start: 2024-01-01 | End: 2024-01-01

## 2024-01-01 RX ORDER — DEXAMETHASONE SODIUM PHOSPHATE 4 MG/ML
INJECTION, SOLUTION INTRA-ARTICULAR; INTRALESIONAL; INTRAMUSCULAR; INTRAVENOUS; SOFT TISSUE AS NEEDED
Status: DISCONTINUED | OUTPATIENT
Start: 2024-01-01 | End: 2024-01-01

## 2024-01-01 RX ORDER — SODIUM CHLORIDE 9 MG/ML
75 INJECTION, SOLUTION INTRAVENOUS CONTINUOUS
Status: ACTIVE | OUTPATIENT
Start: 2024-01-01 | End: 2024-01-01

## 2024-01-01 RX ORDER — PHENYLEPHRINE HCL IN 0.9% NACL 1 MG/10 ML
SYRINGE (ML) INTRAVENOUS AS NEEDED
Status: DISCONTINUED | OUTPATIENT
Start: 2024-01-01 | End: 2024-01-01

## 2024-01-01 RX ORDER — FENTANYL CITRATE 50 UG/ML
INJECTION, SOLUTION INTRAMUSCULAR; INTRAVENOUS
Status: COMPLETED | OUTPATIENT
Start: 2024-01-01 | End: 2024-01-01

## 2024-01-01 RX ORDER — ESCITALOPRAM OXALATE 20 MG/1
20 TABLET ORAL DAILY
Status: DISCONTINUED | OUTPATIENT
Start: 2024-01-01 | End: 2024-01-01 | Stop reason: HOSPADM

## 2024-01-01 RX ORDER — FAMOTIDINE 20 MG/1
20 TABLET, FILM COATED ORAL DAILY
Status: DISCONTINUED | OUTPATIENT
Start: 2024-01-01 | End: 2024-01-01 | Stop reason: HOSPADM

## 2024-01-01 RX ORDER — ATORVASTATIN CALCIUM 80 MG/1
1 TABLET, FILM COATED ORAL DAILY
COMMUNITY
End: 2024-01-01

## 2024-01-01 RX ORDER — POTASSIUM CHLORIDE 1.5 G/1.58G
20 POWDER, FOR SOLUTION ORAL ONCE
Status: COMPLETED | OUTPATIENT
Start: 2024-01-01 | End: 2024-01-01

## 2024-01-01 RX ORDER — ASPIRIN 81 MG/1
81 TABLET ORAL DAILY
Status: DISCONTINUED | OUTPATIENT
Start: 2024-01-01 | End: 2024-01-01 | Stop reason: HOSPADM

## 2024-01-01 RX ADMIN — EPINEPHRINE 0.2 MG: 0.1 INJECTION INTRAVENOUS at 14:21

## 2024-01-01 RX ADMIN — LEVOTHYROXINE SODIUM 75 MCG: 75 TABLET ORAL at 06:11

## 2024-01-01 RX ADMIN — FLUDEOXYGLUCOSE F 18 10.8 MILLICURIE: 200 INJECTION, SOLUTION INTRAVENOUS at 10:40

## 2024-01-01 RX ADMIN — ONDANSETRON 4 MG: 2 INJECTION INTRAMUSCULAR; INTRAVENOUS at 14:07

## 2024-01-01 RX ADMIN — LISINOPRIL 2.5 MG: 2.5 TABLET ORAL at 09:52

## 2024-01-01 RX ADMIN — ENOXAPARIN SODIUM 40 MG: 40 INJECTION SUBCUTANEOUS at 09:20

## 2024-01-01 RX ADMIN — EPINEPHRINE 0.2 MG: 0.1 INJECTION INTRAVENOUS at 14:30

## 2024-01-01 RX ADMIN — FAMOTIDINE 20 MG: 20 TABLET, FILM COATED ORAL at 09:53

## 2024-01-01 RX ADMIN — IOHEXOL 75 ML: 350 INJECTION, SOLUTION INTRAVENOUS at 14:28

## 2024-01-01 RX ADMIN — SODIUM CHLORIDE, POTASSIUM CHLORIDE, SODIUM LACTATE AND CALCIUM CHLORIDE: 600; 310; 30; 20 INJECTION, SOLUTION INTRAVENOUS at 13:35

## 2024-01-01 RX ADMIN — MIDAZOLAM 1 MG: 1 INJECTION INTRAMUSCULAR; INTRAVENOUS at 08:35

## 2024-01-01 RX ADMIN — LISINOPRIL 2.5 MG: 2.5 TABLET ORAL at 09:20

## 2024-01-01 RX ADMIN — Medication 100 MCG: at 14:08

## 2024-01-01 RX ADMIN — LISINOPRIL 2.5 MG: 2.5 TABLET ORAL at 09:53

## 2024-01-01 RX ADMIN — POTASSIUM CHLORIDE 20 MEQ: 1.5 POWDER, FOR SOLUTION ORAL at 10:10

## 2024-01-01 RX ADMIN — SODIUM CHLORIDE 75 ML/HR: 9 INJECTION, SOLUTION INTRAVENOUS at 00:11

## 2024-01-01 RX ADMIN — FAMOTIDINE 20 MG: 20 TABLET, FILM COATED ORAL at 09:52

## 2024-01-01 RX ADMIN — PROPOFOL 100 MCG/KG/MIN: 10 INJECTION, EMULSION INTRAVENOUS at 13:57

## 2024-01-01 RX ADMIN — FENTANYL CITRATE 50 MCG: 50 INJECTION, SOLUTION INTRAMUSCULAR; INTRAVENOUS at 14:22

## 2024-01-01 RX ADMIN — DEXAMETHASONE SODIUM PHOSPHATE 4 MG: 4 INJECTION INTRA-ARTICULAR; INTRALESIONAL; INTRAMUSCULAR; INTRAVENOUS; SOFT TISSUE at 14:07

## 2024-01-01 RX ADMIN — FENTANYL CITRATE 25 MCG: 50 INJECTION, SOLUTION INTRAMUSCULAR; INTRAVENOUS at 08:35

## 2024-01-01 RX ADMIN — Medication 100 MCG: at 14:19

## 2024-01-01 RX ADMIN — EPINEPHRINE 0.2 MG: 0.1 INJECTION INTRAVENOUS at 14:14

## 2024-01-01 RX ADMIN — FENTANYL CITRATE 50 MCG: 50 INJECTION, SOLUTION INTRAMUSCULAR; INTRAVENOUS at 14:06

## 2024-01-01 RX ADMIN — ACETAMINOPHEN 650 MG: 325 TABLET ORAL at 10:14

## 2024-01-01 RX ADMIN — Medication 1000 UNITS: at 09:20

## 2024-01-01 RX ADMIN — ESCITALOPRAM OXALATE 20 MG: 20 TABLET ORAL at 09:53

## 2024-01-01 RX ADMIN — Medication 1000 UNITS: at 09:53

## 2024-01-01 RX ADMIN — LIDOCAINE HYDROCHLORIDE 100 MG: 20 INJECTION, SOLUTION EPIDURAL; INFILTRATION; INTRACAUDAL; PERINEURAL at 14:06

## 2024-01-01 RX ADMIN — ESCITALOPRAM OXALATE 20 MG: 20 TABLET ORAL at 09:51

## 2024-01-01 RX ADMIN — GLYCOPYRROLATE 0.2 MG: 0.2 INJECTION, SOLUTION INTRAMUSCULAR; INTRAVENOUS at 13:51

## 2024-01-01 RX ADMIN — ATORVASTATIN CALCIUM 80 MG: 80 TABLET, FILM COATED ORAL at 09:53

## 2024-01-01 RX ADMIN — Medication 1000 UNITS: at 09:52

## 2024-01-01 RX ADMIN — PROPOFOL 50 MG: 10 INJECTION, EMULSION INTRAVENOUS at 13:48

## 2024-01-01 RX ADMIN — IOHEXOL 60 ML: 350 INJECTION, SOLUTION INTRAVENOUS at 17:47

## 2024-01-01 RX ADMIN — ACETAMINOPHEN 650 MG: 325 TABLET ORAL at 10:10

## 2024-01-01 RX ADMIN — Medication 200 MCG: at 14:14

## 2024-01-01 RX ADMIN — ESCITALOPRAM OXALATE 20 MG: 20 TABLET ORAL at 09:20

## 2024-01-01 RX ADMIN — Medication 50 MCG: at 14:31

## 2024-01-01 RX ADMIN — FAMOTIDINE 20 MG: 20 TABLET, FILM COATED ORAL at 09:20

## 2024-01-01 RX ADMIN — ATORVASTATIN CALCIUM 80 MG: 80 TABLET, FILM COATED ORAL at 09:52

## 2024-01-01 RX ADMIN — PROPOFOL 150 MG: 10 INJECTION, EMULSION INTRAVENOUS at 13:47

## 2024-01-01 RX ADMIN — ATORVASTATIN CALCIUM 80 MG: 80 TABLET, FILM COATED ORAL at 09:20

## 2024-01-01 RX ADMIN — GADOTERATE MEGLUMINE 14 ML: 376.9 INJECTION INTRAVENOUS at 12:49

## 2024-01-01 SDOH — HEALTH STABILITY: MENTAL HEALTH: CURRENT SMOKER: 0

## 2024-01-01 SDOH — SOCIAL STABILITY: SOCIAL INSECURITY: HAVE YOU HAD THOUGHTS OF HARMING ANYONE ELSE?: NO

## 2024-01-01 SDOH — ECONOMIC STABILITY: FOOD INSECURITY: WITHIN THE PAST 12 MONTHS, THE FOOD YOU BOUGHT JUST DIDN'T LAST AND YOU DIDN'T HAVE MONEY TO GET MORE.: NEVER TRUE

## 2024-01-01 SDOH — SOCIAL STABILITY: SOCIAL INSECURITY: DO YOU FEEL ANYONE HAS EXPLOITED OR TAKEN ADVANTAGE OF YOU FINANCIALLY OR OF YOUR PERSONAL PROPERTY?: NO

## 2024-01-01 SDOH — SOCIAL STABILITY: SOCIAL INSECURITY: ABUSE: ADULT

## 2024-01-01 SDOH — ECONOMIC STABILITY: INCOME INSECURITY: IN THE PAST 12 MONTHS, HAS THE ELECTRIC, GAS, OIL, OR WATER COMPANY THREATENED TO SHUT OFF SERVICE IN YOUR HOME?: NO

## 2024-01-01 SDOH — ECONOMIC STABILITY: FOOD INSECURITY: WITHIN THE PAST 12 MONTHS, YOU WORRIED THAT YOUR FOOD WOULD RUN OUT BEFORE YOU GOT MONEY TO BUY MORE.: NEVER TRUE

## 2024-01-01 SDOH — SOCIAL STABILITY: SOCIAL INSECURITY: WERE YOU ABLE TO COMPLETE ALL THE BEHAVIORAL HEALTH SCREENINGS?: YES

## 2024-01-01 SDOH — SOCIAL STABILITY: SOCIAL INSECURITY: ARE YOU OR HAVE YOU BEEN THREATENED OR ABUSED PHYSICALLY, EMOTIONALLY, OR SEXUALLY BY ANYONE?: NO

## 2024-01-01 SDOH — SOCIAL STABILITY: SOCIAL INSECURITY: DO YOU FEEL UNSAFE GOING BACK TO THE PLACE WHERE YOU ARE LIVING?: NO

## 2024-01-01 SDOH — SOCIAL STABILITY: SOCIAL INSECURITY: ARE THERE ANY APPARENT SIGNS OF INJURIES/BEHAVIORS THAT COULD BE RELATED TO ABUSE/NEGLECT?: NO

## 2024-01-01 SDOH — SOCIAL STABILITY: SOCIAL INSECURITY: DOES ANYONE TRY TO KEEP YOU FROM HAVING/CONTACTING OTHER FRIENDS OR DOING THINGS OUTSIDE YOUR HOME?: NO

## 2024-01-01 SDOH — SOCIAL STABILITY: SOCIAL INSECURITY: HAS ANYONE EVER THREATENED TO HURT YOUR FAMILY OR YOUR PETS?: NO

## 2024-01-01 ASSESSMENT — COGNITIVE AND FUNCTIONAL STATUS - GENERAL
STANDING UP FROM CHAIR USING ARMS: A LITTLE
WALKING IN HOSPITAL ROOM: A LITTLE
HELP NEEDED FOR BATHING: A LITTLE
STANDING UP FROM CHAIR USING ARMS: A LITTLE
STANDING UP FROM CHAIR USING ARMS: A LITTLE
MOBILITY SCORE: 20
WALKING IN HOSPITAL ROOM: A LITTLE
DRESSING REGULAR LOWER BODY CLOTHING: A LITTLE
MOBILITY SCORE: 21
CLIMB 3 TO 5 STEPS WITH RAILING: A LOT
HELP NEEDED FOR BATHING: A LITTLE
CLIMB 3 TO 5 STEPS WITH RAILING: A LITTLE
TOILETING: A LITTLE
MOBILITY SCORE: 19
STANDING UP FROM CHAIR USING ARMS: A LITTLE
DAILY ACTIVITIY SCORE: 24
MOBILITY SCORE: 19
CLIMB 3 TO 5 STEPS WITH RAILING: A LOT
MOVING TO AND FROM BED TO CHAIR: A LITTLE
MOVING TO AND FROM BED TO CHAIR: A LITTLE
PATIENT BASELINE BEDBOUND: NO
DAILY ACTIVITIY SCORE: 21
DAILY ACTIVITIY SCORE: 24
PERSONAL GROOMING: A LITTLE
MOVING TO AND FROM BED TO CHAIR: A LITTLE
MOBILITY SCORE: 19
DAILY ACTIVITIY SCORE: 19
MOBILITY SCORE: 19
WALKING IN HOSPITAL ROOM: A LITTLE
CLIMB 3 TO 5 STEPS WITH RAILING: A LITTLE
TURNING FROM BACK TO SIDE WHILE IN FLAT BAD: A LITTLE
MOVING TO AND FROM BED TO CHAIR: A LITTLE
TOILETING: A LITTLE
CLIMB 3 TO 5 STEPS WITH RAILING: A LITTLE
TOILETING: A LITTLE
PERSONAL GROOMING: A LITTLE
DRESSING REGULAR UPPER BODY CLOTHING: A LITTLE
DRESSING REGULAR UPPER BODY CLOTHING: A LITTLE
DRESSING REGULAR LOWER BODY CLOTHING: A LITTLE
WALKING IN HOSPITAL ROOM: A LITTLE
MOVING TO AND FROM BED TO CHAIR: A LITTLE
WALKING IN HOSPITAL ROOM: A LITTLE
DAILY ACTIVITIY SCORE: 19
STANDING UP FROM CHAIR USING ARMS: A LITTLE
CLIMB 3 TO 5 STEPS WITH RAILING: A LITTLE
HELP NEEDED FOR BATHING: A LITTLE
DRESSING REGULAR LOWER BODY CLOTHING: A LITTLE
TURNING FROM BACK TO SIDE WHILE IN FLAT BAD: A LITTLE
DRESSING REGULAR UPPER BODY CLOTHING: A LITTLE
TOILETING: A LITTLE
DRESSING REGULAR LOWER BODY CLOTHING: A LITTLE
MOVING TO AND FROM BED TO CHAIR: A LITTLE
WALKING IN HOSPITAL ROOM: A LITTLE
DAILY ACTIVITIY SCORE: 19
PERSONAL GROOMING: A LITTLE
HELP NEEDED FOR BATHING: A LITTLE
CLIMB 3 TO 5 STEPS WITH RAILING: A LITTLE
MOBILITY SCORE: 23

## 2024-01-01 ASSESSMENT — PAIN SCALES - GENERAL
PAINLEVEL_OUTOF10: 3
PAINLEVEL: 0-NO PAIN
PAINLEVEL_OUTOF10: 0 - NO PAIN
PAINLEVEL_OUTOF10: 5 - MODERATE PAIN
PAINLEVEL_OUTOF10: 0 - NO PAIN
PAINLEVEL_OUTOF10: 3
PAINLEVEL_OUTOF10: 0 - NO PAIN
PAINLEVEL_OUTOF10: 1
PAINLEVEL_OUTOF10: 0 - NO PAIN
PAINLEVEL: 0-NO PAIN
PAINLEVEL_OUTOF10: 0 - NO PAIN

## 2024-01-01 ASSESSMENT — ACTIVITIES OF DAILY LIVING (ADL)
FEEDING YOURSELF: INDEPENDENT
WALKS IN HOME: INDEPENDENT
LACK_OF_TRANSPORTATION: NO
GROOMING: INDEPENDENT
LACK_OF_TRANSPORTATION: NO
BATHING: INDEPENDENT
TOILETING: INDEPENDENT
JUDGMENT_ADEQUATE_SAFELY_COMPLETE_DAILY_ACTIVITIES: YES
HEARING - LEFT EAR: DIFFICULTY WITH NOISE
PATIENT'S MEMORY ADEQUATE TO SAFELY COMPLETE DAILY ACTIVITIES?: YES
HEARING - RIGHT EAR: FUNCTIONAL
ADEQUATE_TO_COMPLETE_ADL: YES
DRESSING YOURSELF: INDEPENDENT

## 2024-01-01 ASSESSMENT — PAIN - FUNCTIONAL ASSESSMENT

## 2024-01-01 ASSESSMENT — PATIENT HEALTH QUESTIONNAIRE - PHQ9
SUM OF ALL RESPONSES TO PHQ9 QUESTIONS 1 & 2: 0
1. LITTLE INTEREST OR PLEASURE IN DOING THINGS: NOT AT ALL
2. FEELING DOWN, DEPRESSED OR HOPELESS: NOT AT ALL

## 2024-01-01 ASSESSMENT — ENCOUNTER SYMPTOMS
EYE PAIN: 1
FREQUENCY: 0
NECK STIFFNESS: 0
ABDOMINAL PAIN: 0
CONFUSION: 0
NECK PAIN: 0
CHILLS: 0
SEIZURES: 0
HEADACHES: 0
FEVER: 0
PHOTOPHOBIA: 0
COUGH: 1
LIGHT-HEADEDNESS: 0
CHEST TIGHTNESS: 0

## 2024-01-01 ASSESSMENT — COLUMBIA-SUICIDE SEVERITY RATING SCALE - C-SSRS
6. HAVE YOU EVER DONE ANYTHING, STARTED TO DO ANYTHING, OR PREPARED TO DO ANYTHING TO END YOUR LIFE?: NO
1. IN THE PAST MONTH, HAVE YOU WISHED YOU WERE DEAD OR WISHED YOU COULD GO TO SLEEP AND NOT WAKE UP?: NO
2. HAVE YOU ACTUALLY HAD ANY THOUGHTS OF KILLING YOURSELF?: NO

## 2024-01-01 ASSESSMENT — LIFESTYLE VARIABLES
AUDIT-C TOTAL SCORE: 0
HOW OFTEN DO YOU HAVE 6 OR MORE DRINKS ON ONE OCCASION: NEVER
HOW OFTEN DO YOU HAVE A DRINK CONTAINING ALCOHOL: NEVER
SUBSTANCE_ABUSE_PAST_12_MONTHS: NO
HOW MANY STANDARD DRINKS CONTAINING ALCOHOL DO YOU HAVE ON A TYPICAL DAY: PATIENT DOES NOT DRINK
AUDIT-C TOTAL SCORE: 0
SKIP TO QUESTIONS 9-10: 1
PRESCIPTION_ABUSE_PAST_12_MONTHS: NO

## 2024-01-01 ASSESSMENT — PAIN DESCRIPTION - LOCATION: LOCATION: HEAD

## 2024-01-08 PROBLEM — F41.1 GENERALIZED ANXIETY DISORDER: Status: ACTIVE | Noted: 2024-01-01

## 2024-01-08 PROBLEM — I25.10 CAD S/P PERCUTANEOUS CORONARY ANGIOPLASTY: Status: ACTIVE | Noted: 2024-01-01

## 2024-01-08 PROBLEM — C44.41 BASAL CELL CARCINOMA OF RIGHT SIDE OF NECK: Status: ACTIVE | Noted: 2018-04-19

## 2024-01-08 PROBLEM — I10 BENIGN ESSENTIAL HYPERTENSION: Status: ACTIVE | Noted: 2024-01-01

## 2024-01-08 PROBLEM — K21.9 GASTROESOPHAGEAL REFLUX DISEASE: Status: ACTIVE | Noted: 2024-01-01

## 2024-01-08 PROBLEM — R91.8 MASS OF LEFT LUNG: Status: ACTIVE | Noted: 2024-01-01

## 2024-01-08 PROBLEM — G00.0: Status: ACTIVE | Noted: 2024-01-01

## 2024-01-08 PROBLEM — Z98.61 CAD S/P PERCUTANEOUS CORONARY ANGIOPLASTY: Status: ACTIVE | Noted: 2024-01-01

## 2024-01-08 NOTE — ED PROVIDER NOTES
EMERGENCY DEPARTMENT ENCOUNTER      Pt Name: Claude Monaco  MRN: [id number]  Birthdate 1945  Date of evaluation: 1/8/2024  Provider: Jim Chaudhary MD    CHIEF COMPLAINT       Chief Complaint   Patient presents with    Fall    Head Injury    LIMITED TRAUMA         HISTORY OF PRESENT ILLNESS    HPI  Patient is a 78-year-old female with history of HTN, HLD, MI presenting after a syncopal episode and collapse.  This occurred approximately half an hour prior to presentation.  Patient has little memory of the events but apparently passed out while standing and fell to the ground, striking the back of her head.  She was unconscious for approximately 45 seconds.  The next and she remembers is being wrestled awake by family.  At presentation, patient is complaining of pain to her posterior scalp.  She otherwise denies fever, chills, chest pain, shortness of breath, nausea, vomiting.  She has no other injuries is complaining of no other pain.    Nursing Notes were reviewed.    PAST MEDICAL HISTORY   No past medical history on file.      SURGICAL HISTORY       Past Surgical History:   Procedure Laterality Date    OTHER SURGICAL HISTORY  04/12/2022    Appendectomy    OTHER SURGICAL HISTORY  04/12/2022    Ovarian surgery    OTHER SURGICAL HISTORY  04/12/2022    Toe fracture repair    OTHER SURGICAL HISTORY  04/12/2022    Percutaneous transluminal coronary angioplasty    OTHER SURGICAL HISTORY  12/27/2022    Cataract surgery         CURRENT MEDICATIONS       Previous Medications    No medications on file       ALLERGIES     Patient has no allergy information on record.    FAMILY HISTORY     No family history on file.       SOCIAL HISTORY       Social History     Socioeconomic History    Marital status:      Spouse name: Not on file    Number of children: Not on file    Years of education: Not on file    Highest education level: Not on file   Occupational History    Not on file   Tobacco Use    Smoking status: Not on  file    Smokeless tobacco: Not on file   Substance and Sexual Activity    Alcohol use: Not on file    Drug use: Not on file    Sexual activity: Not on file   Other Topics Concern    Not on file   Social History Narrative    Not on file     Social Determinants of Health     Financial Resource Strain: Not on file   Food Insecurity: Not on file   Transportation Needs: Not on file   Physical Activity: Not on file   Stress: Not on file   Social Connections: Not on file   Intimate Partner Violence: Not on file   Housing Stability: Not on file       SCREENINGS                        PHYSICAL EXAM    (up to 7 for level 4, 8 or more for level 5)     ED Triage Vitals [01/08/24 1420]   Temp Heart Rate Resp BP   36.1 °C (97 °F) 54 18 120/61      SpO2 Temp src Heart Rate Source Patient Position   99 % -- -- --      BP Location FiO2 (%)     -- --       Physical Exam  Constitutional:       General: She is not in acute distress.     Appearance: She is not toxic-appearing.   HENT:      Head: Normocephalic.      Comments: 5 x 5 cm scalp hematoma to the left occipital parietal region.  No laceration or abrasion.     Right Ear: Tympanic membrane, ear canal and external ear normal.      Left Ear: Tympanic membrane, ear canal and external ear normal.      Nose: Nose normal. No congestion or rhinorrhea.      Mouth/Throat:      Mouth: Mucous membranes are moist.      Pharynx: Oropharynx is clear.   Eyes:      General:         Right eye: No discharge.         Left eye: No discharge.      Extraocular Movements: Extraocular movements intact.      Conjunctiva/sclera: Conjunctivae normal.      Pupils: Pupils are equal, round, and reactive to light.   Cardiovascular:      Rate and Rhythm: Regular rhythm. Bradycardia present.      Pulses: Normal pulses.      Heart sounds: Normal heart sounds.   Pulmonary:      Effort: Pulmonary effort is normal. No respiratory distress.      Breath sounds: Normal breath sounds.   Abdominal:      General: There is  no distension.      Palpations: Abdomen is soft.      Tenderness: There is no abdominal tenderness.   Musculoskeletal:         General: No swelling, tenderness, deformity or signs of injury. Normal range of motion.      Cervical back: Normal range of motion and neck supple. No tenderness.      Right lower leg: No edema.      Left lower leg: No edema.   Skin:     General: Skin is warm and dry.   Neurological:      General: No focal deficit present.      Mental Status: She is oriented to person, place, and time.      Cranial Nerves: No cranial nerve deficit.      Sensory: No sensory deficit.      Motor: No weakness.          DIAGNOSTIC RESULTS     LABS:  Labs Reviewed   URINE CULTURE - Abnormal       Result Value    Urine Culture >100,000 Gram Negative Bacilli (*)    CBC WITH AUTO DIFFERENTIAL - Abnormal    WBC 7.7      nRBC 0.0      RBC 4.41      Hemoglobin 12.4      Hematocrit 40.4      MCV 92      MCH 28.1      MCHC 30.7 (*)     RDW 14.4      Platelets 230      Neutrophils % 66.6      Immature Granulocytes %, Automated 0.7      Lymphocytes % 20.6      Monocytes % 9.1      Eosinophils % 2.5      Basophils % 0.5      Neutrophils Absolute 5.10      Immature Granulocytes Absolute, Automated 0.05      Lymphocytes Absolute 1.58      Monocytes Absolute 0.70      Eosinophils Absolute 0.19      Basophils Absolute 0.04     COMPREHENSIVE METABOLIC PANEL - Abnormal    Glucose 101 (*)     Sodium 143      Potassium 3.5      Chloride 106      Bicarbonate 31      Anion Gap 10      Urea Nitrogen 11      Creatinine 0.86      eGFR 70      Calcium 8.7      Albumin 3.6      Alkaline Phosphatase 94      Total Protein 6.3 (*)     AST 14      Bilirubin, Total 0.5      ALT 8     D-DIMER, VTE EXCLUSION - Abnormal    D-Dimer, Quantitative VTE Exclusion 1,247 (*)     Narrative:     The VTE Exclusion D-Dimer assay is reported in ng/mL Fibrinogen Equivalent Units (FEU).    Per 's instructions for use, a value of less than 500 ng/mL  (FEU) may help to exclude DVT or PE in outpatients when the assay is used with a clinical pretest probability assessment.([facility] must utilize and document eCalc 'Wells Score Deep Vein Thrombosis Risk' for DVT exclusion only. Emergency Department should utilize  Guidelines for Emergency Department Use of the VTE Exclusion D-Dimer and Clinical Pretest probability assessment model for DVT or PE exclusion.)   CBC - Abnormal    WBC 7.5      nRBC 0.0      RBC 4.09      Hemoglobin 11.7 (*)     Hematocrit 37.6      MCV 92      MCH 28.6      MCHC 31.1 (*)     RDW 14.4      Platelets 212     RENAL FUNCTION PANEL - Abnormal    Glucose 89      Sodium 143      Potassium 3.7      Chloride 110 (*)     Bicarbonate 28      Anion Gap 9 (*)     Urea Nitrogen 11      Creatinine 0.74      eGFR 84      Calcium 8.2 (*)     Phosphorus 3.4      Albumin 3.4     URINALYSIS WITH REFLEX MICROSCOPIC - Abnormal    Color, Urine Yellow      Appearance, Urine Hazy (*)     Specific Gravity, Urine 1.051 (*)     pH, Urine 6.0      Protein, Urine 30 (1+) (*)     Glucose, Urine NEGATIVE      Blood, Urine NEGATIVE      Ketones, Urine NEGATIVE      Bilirubin, Urine NEGATIVE      Urobilinogen, Urine <2.0      Nitrite, Urine POSITIVE (*)     Leukocyte Esterase, Urine MODERATE (2+) (*)    OSMOLALITY - Abnormal    Osmolality, Serum 301 (*)    MICROSCOPIC ONLY, URINE - Abnormal    WBC, Urine 21-50 (*)     RBC, Urine 11-20 (*)     Squamous Epithelial Cells, Urine 1-9 (SPARSE)      Bacteria, Urine 4+ (*)    CBC WITH AUTO DIFFERENTIAL - Abnormal    WBC 6.8      nRBC 0.0      RBC 4.25      Hemoglobin 12.2      Hematocrit 40.6      MCV 96      MCH 28.7      MCHC 30.0 (*)     RDW 14.1      Platelets 193      Neutrophils % 64.0      Immature Granulocytes %, Automated 0.4      Lymphocytes % 22.8      Monocytes % 9.4      Eosinophils % 2.7      Basophils % 0.7      Neutrophils Absolute 4.34      Immature Granulocytes Absolute, Automated 0.03      Lymphocytes Absolute  1.55      Monocytes Absolute 0.64      Eosinophils Absolute 0.18      Basophils Absolute 0.05     RENAL FUNCTION PANEL - Abnormal    Glucose 89      Sodium 140      Potassium 3.5      Chloride 107      Bicarbonate 25      Anion Gap 12      Urea Nitrogen 11      Creatinine 0.78      eGFR 79      Calcium 8.4 (*)     Phosphorus 3.8      Albumin 3.5     CBC WITH AUTO DIFFERENTIAL - Abnormal    WBC 6.6      nRBC 0.0      RBC 4.00      Hemoglobin 11.2 (*)     Hematocrit 36.7      MCV 92      MCH 28.0      MCHC 30.5 (*)     RDW 14.0      Platelets 194      Neutrophils % 74.7      Immature Granulocytes %, Automated 0.5      Lymphocytes % 14.7      Monocytes % 9.9      Eosinophils % 0.0      Basophils % 0.2      Neutrophils Absolute 4.93      Immature Granulocytes Absolute, Automated 0.03      Lymphocytes Absolute 0.97      Monocytes Absolute 0.65      Eosinophils Absolute 0.00      Basophils Absolute 0.01     RENAL FUNCTION PANEL - Abnormal    Glucose 117 (*)     Sodium 140      Potassium 3.8      Chloride 104      Bicarbonate 28      Anion Gap 12      Urea Nitrogen 10      Creatinine 0.79      eGFR 78      Calcium 8.6      Phosphorus 3.7      Albumin 3.6     MAGNESIUM - Normal    Magnesium 2.09     PROTIME-INR - Normal    Protime 11.3      INR 1.0     SERIAL TROPONIN-INITIAL - Normal    Troponin I, High Sensitivity 4      Narrative:     Less than 99th percentile of normal range cutoff-  Female and children under 18 years old <14 ng/L; Male <21 ng/L: Negative  Repeat testing should be performed if clinically indicated.     Female and children under 18 years old 14-50 ng/L; Male 21-50 ng/L:  Consistent with possible cardiac damage and possible increased clinical   risk. Serial measurements may help to assess extent of myocardial damage.     >50 ng/L: Consistent with cardiac damage, increased clinical risk and  myocardial infarction. Serial measurements may help assess extent of   myocardial damage.      NOTE: Children less  than 1 year old may have higher baseline troponin   levels and results should be interpreted in conjunction with the overall   clinical context.     NOTE: Troponin I testing is performed using a different   testing methodology at [facility] than at other   [facility] hospitals. Direct result comparisons should only   be made within the same method.   SERIAL TROPONIN, 1 HOUR - Normal    Troponin I, High Sensitivity 4      Narrative:     Less than 99th percentile of normal range cutoff-  Female and children under 18 years old <14 ng/L; Male <21 ng/L: Negative  Repeat testing should be performed if clinically indicated.     Female and children under 18 years old 14-50 ng/L; Male 21-50 ng/L:  Consistent with possible cardiac damage and possible increased clinical   risk. Serial measurements may help to assess extent of myocardial damage.     >50 ng/L: Consistent with cardiac damage, increased clinical risk and  myocardial infarction. Serial measurements may help assess extent of   myocardial damage.      NOTE: Children less than 1 year old may have higher baseline troponin   levels and results should be interpreted in conjunction with the overall   clinical context.     NOTE: Troponin I testing is performed using a different   testing methodology at [facility] than at other   [facility]. Direct result comparisons should only   be made within the same method.   TSH WITH REFLEX TO FREE T4 IF ABNORMAL - Normal    Thyroid Stimulating Hormone 0.88      Narrative:     TSH testing is performed using different testing methodology at [facility] than at other [facility]. Direct result comparisons should only be made within the same method.     CORTISOL AM - Normal    Cortisol  A.M. 15.2     MAGNESIUM - Normal    Magnesium 2.05     ACUTE TOXICOLOGY PANEL, BLOOD - Normal    Acetaminophen <10.0      Salicylate  <3      Alcohol <10     OSMOLALITY, URINE - Normal    Osmolality, Urine  Random 683     TROPONIN I, HIGH SENSITIVITY - Normal    Troponin I, High Sensitivity 5      Narrative:     Less than 99th percentile of normal range cutoff-  Female and children under 18 years old <14 ng/L; Male <21 ng/L: Negative  Repeat testing should be performed if clinically indicated.     Female and children under 18 years old 14-50 ng/L; Male 21-50 ng/L:  Consistent with possible cardiac damage and possible increased clinical   risk. Serial measurements may help to assess extent of myocardial damage.     >50 ng/L: Consistent with cardiac damage, increased clinical risk and  myocardial infarction. Serial measurements may help assess extent of   myocardial damage.      NOTE: Children less than 1 year old may have higher baseline troponin   levels and results should be interpreted in conjunction with the overall   clinical context.     NOTE: Troponin I testing is performed using a different   testing methodology at [facility] than at other   [facility]. Direct result comparisons should only   be made within the same method.   MAGNESIUM - Normal    Magnesium 2.07     CORTISOL AM - Normal    Cortisol  A.M. 17.4     TROPONIN SERIES- (INITIAL, 1 HR)    Narrative:     The following orders were created for panel order Troponin I Series, High Sensitivity (0, 1 HR).  Procedure                               Abnormality         Status                     ---------                               -----------         ------                     Troponin I, High Sensiti...[[id number]]  Normal              Final result               Troponin, High Sensitivi...[[id number]]  Normal              Final result                 Please view results for these tests on the individual orders.   SODIUM, URINE RANDOM    Sodium, Urine Random 115      Creatinine, Urine Random 144.1      Sodium/Creatinine Ratio 80     SURGICAL PATHOLOGY EXAM   CYTOLOGY CONSULTATION (NON-GYNECOLOGIC)       All other labs were within normal  range or not returned as of this dictation.    Imaging  CT abdomen pelvis w IV contrast   Final Result   1. Numerous hepatic, bilateral adrenal, and pelvic/proximal femur   osteoblastic metastases. A few subcentimeter left retroperitoneal   nodules may also be metastatic.   2. Abdominal aortic aneurysm is slightly enlarged from 3.2 cm to 3.5   cm compared to 5 years ago.        MACRO:   None.        Signed by: Jaswant Mayorga 1/9/2024 2:52 PM   Dictation workstation:   [id number]      Transthoracic Echo (TTE) Complete   Final Result      MR brain w and wo IV contrast   Final Result   1. No evidence of metastatic disease within the brain was identified.   2. Chronic findings as detailed above.        I personally reviewed the images/study and I agree with the findings   as stated. This study was interpreted at [place], [place].        MACRO:   None        Signed by: Pito Zepeda 1/9/2024 1:49 PM   Dictation workstation:   [id number]      CT angio chest for pulmonary embolism   Final Result   1. No evidence of pulmonary embolus to the level of the mid segmental   arteries. The distal segmental arteries are suboptimally assessed as   detailed above.   2. Dense left upper lobe consolidation with occlusion of the left   upper lobe bronchi proximally. Areas of relatively low-density which   appear to displace the adjacent bronchovascular structures within the   central part of the consolidation raising concern for underlying   mass. There is likely superimposed volume loss and postobstructive   pneumonia.   3. Enlarged prevascular lymph nodes measuring up to 2.2 cm. Prominent   right paratracheal nodes measuring up to 1 cm.   4. Partially visualized bilateral adrenal masses measuring at least   4.4 cm on the left and 2.8 cm in the right concerning for adrenal   metastases. Recommend dedicated CT of the abdomen and pelvis.   5. A few scattered nodularities within the left  breast, recommend   mammographic correlation.        MACRO:   Critical Finding:  See findings. Notification was initiated on   1/8/2024 at 6:21 pm by  Ruben Whitten.  (**-YCF-**) Instructions:        Signed by: Ruben Whitten 1/8/2024 6:22 PM   Dictation workstation:   [id number]      XR chest 1 view   Final Result   Masslike opacity over the left upper lung, as above. Further   evaluation with CT of the chest is recommended.        MACRO:   None.        Signed by: Tobias Feliciano 1/8/2024 3:32 PM   Dictation workstation:   [id number]      CT head wo IV contrast   Final Result   1. Diffuse volume loss and periventricular white matter changes, most   consistent with small vessel ischemic disease.   2. No evidence of acute cortical infarct or intracranial hemorrhage.             MACRO:   None        Signed by: Tobias Feliciano 1/8/2024 3:02 PM   Dictation workstation:   [id number]      CT cervical spine wo IV contrast   Final Result   1.  No CT evidence of acute fracture.   2.  Degenerative changes throughout the cervical spine, as above.   3. Masslike density in the left lung apex, as above. Further   evaluation with CT of the chest is recommended.        Signed by: Tobais Feliciano 1/8/2024 3:06 PM   Dictation workstation:   [id number]      Consult to Interventional Radiology    (Results Pending)        Procedures  Procedures     EMERGENCY DEPARTMENT COURSE/MDM:     ED Course as of 01/11/24 1228   Mon Jan 08, 2024   1625 CT angio chest for pulmonary embolism [TN]      ED Course User Index  [TN] Jim Chaudhary MD         Diagnoses as of 01/11/24 1228   Mass of left lung        Medical Decision Making  History obtained from the patient.  Records including labs, imaging, notes reviewed.  Limited trauma was called given the positive loss of consciousness.  Secondary exam unremarkable except for a small scalp hematoma.  Patient was sent to CT for scan of the head and cervical spine.  Head CT was unremarkable for acute  findings.  CT of the cervical spine showed degenerative changes.  Both labs and a chest x-ray demonstrated findings concerning for a possible left upper lobe mass.  D-dimer was notably elevated at 1200 as well.  For these reasons, a CT angio of the chest was ordered.  In the meantime, additional cardiac labs were obtained.  Troponin series unremarkable with an EKG that demonstrated no acute injury pattern.  Hematology and chemistry unremarkable.  No electrolyte abnormalities.  CT of the chest demonstrated findings concerning for possible left upper lobe lung malignancy with associated lymphadenopathy and possible bilateral adrenal metastases.  This discussed at length with the patient and her family who agreed to admission for further evaluation and treatment as indicated.  Given the possibility that the syncopal event was related to the mass effect compressing her bronchi, medicine agreed to inpatient evaluation.    EKG demonstrated sinus bradycardia at a rate of 55, QTc of 455.  No acute injury pattern seen.    Patient and or family in agreement and understanding of treatment plan.  All questions answered.      I reviewed the case with the attending ED physician. The attending ED physician agrees with the plan. Patient and/or patient´s representative was counseled regarding labs, imaging, likely diagnosis, and plan. All questions were answered.    ED Medications administered this visit:    Medications   enoxaparin (Lovenox) syringe 40 mg ( subcutaneous Dose Auto Held 1/17/24 0900)   polyethylene glycol (Glycolax, Miralax) packet 17 g (17 g oral Not Given 1/11/24 0900)   aspirin EC tablet 81 mg ( oral Dose Auto Held 1/17/24 0900)   atorvastatin (Lipitor) tablet 80 mg (80 mg oral Given 1/11/24 0952)   cholecalciferol (Vitamin D-3) tablet 1,000 Units (1,000 Units oral Given 1/11/24 0952)   escitalopram (Lexapro) tablet 20 mg (20 mg oral Given 1/11/24 0951)   famotidine (Pepcid) tablet 20 mg (20 mg oral Given 1/11/24  0952)   levothyroxine (Synthroid, Levoxyl) tablet 75 mcg (75 mcg oral Given 1/11/24 0611)   lisinopril tablet 2.5 mg (2.5 mg oral Given 1/11/24 0952)   nitroglycerin (Nitrostat) SL tablet 0.4 mg (has no administration in time range)   sodium chloride 0.9% infusion (0 mL/hr intravenous Stopped 1/9/24 1023)   perflutren lipid microspheres (Definity) injection 0.5-10 mL of dilution (has no administration in time range)   sulfur hexafluoride microsphr (Lumason) injection 24.28 mg (has no administration in time range)   perflutren protein A microsphere (Optison) injection 0.5 mL (has no administration in time range)   oxygen (O2) therapy (has no administration in time range)   iohexol (OMNIPaque) 350 mg iodine/mL solution 60 mL (60 mL intravenous Given 1/8/24 1747)   acetaminophen (Tylenol) tablet 650 mg (650 mg oral Given 1/9/24 1010)   potassium chloride (Klor-Con) packet 20 mEq (20 mEq oral Given 1/9/24 1010)   gadoterate meglumine (Dotarem) 0.5 mmol/mL contrast injection 14 mL (14 mL intravenous Given 1/9/24 1249)   iohexol (OMNIPaque) 350 mg iodine/mL solution 75 mL (75 mL intravenous Given 1/9/24 1428)   acetaminophen (Tylenol) tablet 650 mg (650 mg oral Given 1/10/24 1014)   EPINEPHrine (Adrenalin) 1 mg/10mL injection (0.2 mg intravenous Given 1/10/24 1430)       New Prescriptions from this visit:    New Prescriptions    No medications on file       Follow-up:  No follow-up provider specified.      Final Impression:   1. Mass of left lung    2. Syncope and collapse    3. Lung mass          (Please note that portions of this note were completed with a voice recognition program.  Efforts were made to edit the dictations but occasionally words are mis-transcribed.)     Jim Chaudhary MD  Resident  01/11/24 3246

## 2024-01-08 NOTE — ED NOTES
1410 Limited trauma one push activation by THU Boyce responded      Berna Porras, EMT  01/08/24 1415

## 2024-01-09 PROBLEM — H90.3 ASYMMETRICAL SENSORINEURAL HEARING LOSS: Status: ACTIVE | Noted: 2024-01-01

## 2024-01-09 PROBLEM — H70.91 MASTOIDITIS OF RIGHT SIDE: Status: ACTIVE | Noted: 2024-01-01

## 2024-01-09 PROBLEM — G00.9: Status: ACTIVE | Noted: 2024-01-01

## 2024-01-09 PROBLEM — E78.2 MIXED HYPERLIPIDEMIA: Status: ACTIVE | Noted: 2024-01-01

## 2024-01-09 PROBLEM — H90.6 MIXED CONDUCTIVE AND SENSORINEURAL HEARING LOSS OF BOTH EARS: Status: ACTIVE | Noted: 2024-01-01

## 2024-01-09 PROBLEM — E66.3 OVERWEIGHT WITH BODY MASS INDEX (BMI) OF 26 TO 26.9 IN ADULT: Status: ACTIVE | Noted: 2024-01-01

## 2024-01-09 NOTE — PROGRESS NOTES
Physical Therapy    Physical Therapy    Physical Therapy Evaluation    Patient Name: Claude Monaco  MRN: [id number]  Today's Date: 1/9/2024   Time Calculation  Start Time: 1038  Stop Time: 1051  Time Calculation (min): 13 min    Assessment/Plan   PT Assessment  PT Assessment Results:  (No impairments requiring PT intervention at this time)  End of Session Communication: Bedside nurse  Assessment Comment: Pt presents at baseline functional status. IND with bed mob/transfers, and only distant S to  ft on unit. No LOB. Pt has nearly 24 hour assist at home. No PT needs at this time. Spoke with RN and advised pt safe to AMB within room from PT perspective, though pt aware that nursing must give final OK for this.  End of Session Patient Position: Alarm off, not on at start of session (RN stated pt does not require bed alarm)  IP OR SWING BED PT PLAN  Inpatient or Swing Bed: Inpatient  PT Plan  PT Plan: PT Eval only  PT Eval Only Reason: At baseline function  PT Recommended Transfer Status: Independent  PT - OK to Discharge: Yes , to recommended next level of care as indicated in PT eval, once cleared by medical team. No further PT needs; will DC order.      Subjective     Current Problem:  Patient Active Problem List   Diagnosis    Mass of left lung    Basal cell carcinoma of right side of neck    Benign essential hypertension    CAD S/P percutaneous coronary angioplasty    Gastroesophageal reflux disease    Generalized anxiety disorder    H. influenzae meningitis       General Visit Information:  General  Reason for Referral: decreased cognition/safety awareness  Referred By: Dr. Velázquez  Past Medical History Relevant to Rehab: Pt admitted with syncopal event and collapse; struck head during fall. CT spine negative for fx; CT head negative for acute change though positive for volume loss and chronic microvascular ischemic changes. Imaging did reveal L upper lunch mass, L breast nodules, and B adrenal mets. D-dimer  elevated but CT negative for PE. PMH includes meningitis, CAD s/p PTCA, HDL, hypothyroidism, anxiety, melanoma  Family/Caregiver Present: Yes  Caregiver Feedback: [place] and his fisrinivase present  Prior to Session Communication: Bedside nurse  Patient Position Received: Bed, 3 rail up, Alarm off, not on at start of session  General Comment: Pt agreeable to eval    Home Living:  Home Living  Home Living Comments: Pt lives with christa and his yelenae in one story home with 1 small step to enter. Nearly 24 hour assistance. WIS with shower seat.    Prior Level of Function:  Prior Function Per Pt/Caregiver Report  Prior Function Comments: Pt AMB IND without assistive device, but does own RW and cane. Denies falls in past 6 months aside from syncopal event that led to this admission. I with ADL and IADL; drives during daylight hours only.    Precautions:  Precautions  Medical Precautions: Fall precautions    Vital Signs:     Objective     Pain:  Pain Assessment  Pain Assessment: 0-10  Pain Score: 5 - Moderate pain  Pain Location: Head  Pain Interventions: Medication (See MAR)    Cognition:  Cognition  Overall Cognitive Status: Within Functional Limits    General Assessments:      Activity Tolerance  Endurance: Endurance does not limit participation in activity  Sensation  Light Touch: No apparent deficits        Coordination  Movements are Fluid and Coordinated: Yes     Static Sitting Balance  Static Sitting-Comment/Number of Minutes: Normal  Dynamic Sitting Balance  Dynamic Sitting-Comments: Normal  Static Standing Balance  Static Standing-Comment/Number of Minutes: Good  Dynamic Standing Balance  Dynamic Standing-Comments: Good; safe with 180 degree turns    Functional Assessments:     Bed Mobility  Bed Mobility: Yes  Bed Mobility 1  Bed Mobility Comments 1: IND sup<>sit  Transfers  Transfer: Yes  Transfer 1  Transfer From 1: Sit to  Transfer to 1: Stand  Transfer Level of Assistance 1: Independent  Ambulation/Gait  Training  Ambulation/Gait Training Performed: Yes  Ambulation/Gait Training 1  Surface 1: Level tile  Device 1: No device  Assistance 1: Distant supervision  Quality of Gait 1:  (Decreased velocity initially but increased with time. Slightly wide SAIRA. No other belen deviations, no LOB. Safe with talking during AMB as well as 180 degree turns and obstacle negotiation.)  Comments/Distance (ft) 1: 400          Extremity/Trunk Assessments:  RUE   RUE : Within Functional Limits  LUE   LUE: Within Functional Limits  RLE   RLE : Within Functional Limits  LLE   LLE : Within Functional Limits    Outcome Measures:  [facility] Basic Mobility  Turning from your back to your side while in a flat bed without using bedrails: None  Moving from lying on your back to sitting on the side of a flat bed without using bedrails: None  Moving to and from bed to chair (including a wheelchair): None  Standing up from a chair using your arms (e.g. wheelchair or bedside chair): None  To walk in hospital room: None  Climbing 3-5 steps with railing: A little  Basic Mobility - Total Score: 23                            Goals:    Education Documentation  Precautions, taught by Dolly Fowler PT at 1/9/2024 12:05 PM.  Learner: Family, Patient  Readiness: Acceptance  Method: Explanation  Response: Verbalizes Understanding  Comment: Pt educated in role of PT and in plans to DC pt from acute care PT at this time. Pt understands and agrees with plan. Instructed pt that if any changes occur, PT may be reconsulted.    Education Comments  No comments found.

## 2024-01-09 NOTE — PROGRESS NOTES
Claude Monaco is a 78 y.o. female on day 1 of admission presenting with Mass of left lung.      Subjective     Today, patient denies chest pain, SOB, lightheadedness, fever, chills. night sweats, fatigue, appetite change, any disturbance in motor or sensory function, or change in memory. She admits to a headache today but reports that she normally has headaches and eye pain due to her macular degeneration. She denied coughing, laughing, swallowing, or hyperventilating prior to her syncopal episode. She denied feeling hot or feverish before the episode. She denied wearing anything tight around her neck that may have caused the syncopal episode. She states that she has anxiety and is on Lexapro, but denies a history of panic disorders. She denies a history of diabetes.     Patient states that she smoked 1 pack of cigarettes for the past 63 years but quit 2 years ago after having an acute myocardial infarction. However, she reports resuming after the event, now smoking 1/2 pack of cigarettes per day. She denies drinking alcohol or using recreational or illicit drug use. She lives with her grandson and his girlfriend.       Objective     Last Recorded Vitals  /73 (BP Location: Right arm, Patient Position: Sitting)   Pulse 57   Temp 36.9 °C (98.4 °F) (Temporal)   Resp 16   Wt 68.9 kg (152 lb)   SpO2 98%   Intake/Output last 3 Shifts:    Intake/Output Summary (Last 24 hours) at 1/9/2024 1230  Last data filed at 1/9/2024 1022  Gross per 24 hour   Intake 763.75 ml   Output --   Net 763.75 ml       Admission Weight  Weight: 68.9 kg (152 lb) (01/08/24 2051)    Daily Weight  01/08/24 : 68.9 kg (152 lb)    Image Results  CT angio chest for pulmonary embolism  Narrative: Interpreted By:  Ruben Whitten,   STUDY:  CT ANGIO CHEST FOR PULMONARY EMBOLISM;  1/8/2024 5:50 pm      INDICATION:  Signs/Symptoms:syncope, elevated dimer, also further elucidation of  lung mass seen on c spine CT.      COMPARISON:  None.       ACCESSION NUMBER(S):  [id number]      ORDERING CLINICIAN:  JOE ESQUIVEL      TECHNIQUE:  Contiguous axial images of the chest were obtained after the  intravenous administration of 60 mL Omnipaque 350 contrast using  angiographic PE protocol. Coronal and sagittal reformatted images  were reconstructed from the axial data. MIP images were created on an  independent workstation and reviewed.      FINDINGS:  PULMONARY ARTERIES: Adequate opacification to the level of the mid  segmental arteries. The distal segmental and subsegmental arteries,  particularly in the left upper lobe limited by mixing artifact and  volume loss/consolidation. No filling defect to suggest pulmonary  embolus in the visualized pulmonary arteries. The main pulmonary  artery is normal in diameter. There is reflux of contrast into the  hepatic venous circuit suggesting impaired right heart function.      HEART: Normal in size. Mild to moderate triple-vessel coronary  vascular calcifications. No significant pericardial effusion.      VESSELS: Normal caliber aorta without dissection. Moderate  atherosclerotic calcifications of the aortic arch and descending  thoracic aorta.      MEDIASTINUM AND LYMPH NODES: Enlarged prevascular lymph nodes  measuring up to 2.2 x 1.6 cm. The visualized thyroid is within normal  limits. No pneumomediastinum. The esophagus appears within normal  limits.      LUNG, AIRWAYS, AND PLEURA: The trachea and mainstem bronchi are  patent. The left upper lobe bronchi are occluded proximally. Dense  consolidation involving the majority of the left upper lobe with  associated volume loss. There is a relatively low-density region  centrally within the consolidation which appears to exert mass effect  upon the adjacent bronchovascular structures, raising concern for  underlying mass. Paraseptal and centrilobular emphysematous changes  in the lung apices. No pleural effusion or pneumothorax.      OSSEOUS STRUCTURES: No acute  osseous abnormality.      CHEST WALL SOFT TISSUES: 1.2 cm nodule in the superficial  subcutaneous soft tissues of the midline upper chest, possibly a  sebaceous cyst. 0.8 cm nodule in the subcutaneous fat of the medial  aspect of the left breast. Recommend mammographic correlation.      UPPER ABDOMEN/OTHER: Probable enlarged lymph node in the  gastrohepatic ligament, suboptimally visualized. Diffuse  thickening/enlargement of the bilateral adrenal glands concerning for  adrenal metastases.      Impression: 1. No evidence of pulmonary embolus to the level of the mid segmental  arteries. The distal segmental arteries are suboptimally assessed as  detailed above.  2. Dense left upper lobe consolidation with occlusion of the left  upper lobe bronchi proximally. Areas of relatively low-density which  appear to displace the adjacent bronchovascular structures within the  central part of the consolidation raising concern for underlying  mass. There is likely superimposed volume loss and postobstructive  pneumonia.  3. Enlarged prevascular lymph nodes measuring up to 2.2 cm. Prominent  right paratracheal nodes measuring up to 1 cm.  4. Partially visualized bilateral adrenal masses measuring at least  4.4 cm on the left and 2.8 cm in the right concerning for adrenal  metastases. Recommend dedicated CT of the abdomen and pelvis.  5. A few scattered nodularities within the left breast, recommend  mammographic correlation.      MACRO:  Critical Finding:  See findings. Notification was initiated on  1/8/2024 at 6:21 pm by  Ruben Whitten.  (**-YCF-**) Instructions:      Signed by: Ruben Whitten 1/8/2024 6:22 PM  Dictation workstation:   [id number]  XR chest 1 view  Narrative: Interpreted By:  Tobias Feliciano,   STUDY:  XR CHEST 1 VIEW  1/8/2024 3:21 pm      INDICATION:  Signs/Symptoms:Chest Pain      COMPARISON:  06/22/2018      ACCESSION NUMBER(S):  [id number]      ORDERING CLINICIAN:  JOE ESQUIVEL      TECHNIQUE:  A single AP  portable radiograph of the chest was obtained.      FINDINGS:  Multiple cardiac monitoring leads are seen over the chest.  A large  masslike opacity is seen over the left upper lung and may represent  mass and/or pneumonia. No pneumothorax is identified. The cardiac  silhouette is within normal limits for size.      Impression: Masslike opacity over the left upper lung, as above. Further  evaluation with CT of the chest is recommended.      MACRO:  None.      Signed by: Tobias Feliciano 1/8/2024 3:32 PM  Dictation workstation:   [id number]  ECG 12 lead  Sinus bradycardia  Otherwise normal ECG  When compared with ECG of 30-DEC-2021 13:51,  ST no longer elevated in Inferior leads  T wave inversion no longer evident in Inferior leads  T wave inversion less evident in Anterolateral leads  CT cervical spine wo IV contrast  Narrative: Interpreted By:  Tobias Feliciano,   STUDY:  CT CERVICAL SPINE WO IV CONTRAST;  1/8/2024 2:42 pm      INDICATION:  Signs/Symptoms:syncope, fall with head trauma.      COMPARISON:  None.      ACCESSION NUMBER(S):  [id number]      ORDERING CLINICIAN:  JOE ESQUIVEL      TECHNIQUE:  Contiguous axial CT images were obtained at  2 mm slice thickness  through the cervical spine without contrast administration. The  images were then reconstructed in the coronal and sagittal planes.      FINDINGS:  There is no CT evidence of acute fracture identified. No prevertebral  soft tissue swelling is identified.      ALIGNMENT:  The vertebral bodies are well aligned without evidence of subluxation.      VERTEBRAE/DISC SPACES:  Moderate disc space narrowing and bridging osteophyte formation is  seen at the C5-6 and C6-7 levels. Mild facet degenerative changes are  seen throughout the cervical spine.      C2-3:  There is no significant neural foraminal or central canal narrowing  identified.      C3-4:  There is no significant neural foraminal or central canal narrowing  identified.      C4-5:  There is no  significant neural foraminal or central canal narrowing  identified.      C5-6:  Moderate to severe neural foraminal narrowing is seen bilaterally. No  significant central canal narrowing is seen.      C6-7:  Moderate to severe neural foraminal narrowing is seen bilaterally. No  significant central canal narrowing is seen.      C7-T1:  Mild neural foraminal narrowing is seen bilaterally. No significant  central canal narrowing is seen.      ADDITIONAL FINDINGS:  Evaluation of the visualized soft tissues of the neck is limited by  the lack of intravenous contrast. A masslike density is seen at the  anterior aspect of the left upper lung. Dense atherosclerotic  calcifications are seen in the carotid bulbs bilaterally.      Impression: 1.  No CT evidence of acute fracture.  2.  Degenerative changes throughout the cervical spine, as above.  3. Masslike density in the left lung apex, as above. Further  evaluation with CT of the chest is recommended.      Signed by: Tobias Feliciano 1/8/2024 3:06 PM  Dictation workstation:   [id number]  CT head wo IV contrast  Narrative: Interpreted By:  Tobias Feliciano,   STUDY:  CT HEAD WO IV CONTRAST; 1/8/2024 2:42 pm      INDICATION:  Signs/Symptoms:syncope, fall with head trauma.      COMPARISON:  CT head dated 06/17/2018      ACCESSION NUMBER(S):  [id number]      ORDERING CLINICIAN:  JOE ESQUIVEL      TECHNIQUE:  Contiguous axial CT images were obtained through the head at 5 mm  slice thickness without contrast administration.      FINDINGS:  INTRACRANIAL:  Mild diffuse volume loss is seen bilaterally. Mild periventricular  white matter changes are seen. Encephalomalacia is seen in the  posterior right occipital lobe, consistent with prior infarct. The  grey-white differentiation is otherwise intact. There is no mass  effect or midline shift. There is no extraaxial fluid collection.  There is no intracranial hemorrhage.  The calvarium  is unremarkable.      EXTRACRANIAL:  Visualized  paranasal sinuses and mastoids are clear.      Impression: 1. Diffuse volume loss and periventricular white matter changes, most  consistent with small vessel ischemic disease.  2. No evidence of acute cortical infarct or intracranial hemorrhage.          MACRO:  None      Signed by: Tobias Feliciano 1/8/2024 3:02 PM  Dictation workstation:   [id number]      Physical Exam  Constitutional:       General: She is not in acute distress.     Appearance: Normal appearance. She is not ill-appearing.   HENT:      Head: Normocephalic and atraumatic.      Comments: Left occipital parietal hematoma, improved from yesterday per patient  Cardiovascular:      Rate and Rhythm: Normal rate and regular rhythm.      Pulses: Normal pulses.      Heart sounds: Normal heart sounds.   Pulmonary:      Effort: Pulmonary effort is normal. No respiratory distress.      Breath sounds: Normal breath sounds.   Abdominal:      General: Abdomen is flat. Bowel sounds are normal. There is no distension.      Palpations: Abdomen is soft.      Tenderness: There is no abdominal tenderness.   Skin:     General: Skin is warm and dry.   Neurological:      General: No focal deficit present.      Mental Status: She is alert and oriented to person, place, and time.      Sensory: No sensory deficit.         Relevant Results    Scheduled medications  [Held by provider] aspirin, 81 mg, oral, Daily  atorvastatin, 80 mg, oral, Daily  cholecalciferol, 1,000 Units, oral, Daily  enoxaparin, 40 mg, subcutaneous, q24h  escitalopram, 20 mg, oral, Daily  famotidine, 20 mg, oral, Daily  gadoterate meglumine, 14 mL, intravenous, Once in imaging  levothyroxine, 75 mcg, oral, Daily  lisinopril, 2.5 mg, oral, Daily  polyethylene glycol, 17 g, oral, Daily      Continuous medications  sodium chloride 0.9%, 75 mL/hr, Last Rate: 75 mL/hr (01/09/24 1022)      PRN medications  PRN medications: nitroglycerin    Results for orders placed or performed during the hospital encounter of  01/08/24 (from the past 24 hour(s))   CBC and Auto Differential   Result Value Ref Range    WBC 7.7 4.4 - 11.3 x10*3/uL    nRBC 0.0 0.0 - 0.0 /100 WBCs    RBC 4.41 4.00 - 5.20 x10*6/uL    Hemoglobin 12.4 12.0 - 16.0 g/dL    Hematocrit 40.4 36.0 - 46.0 %    MCV 92 80 - 100 fL    MCH 28.1 26.0 - 34.0 pg    MCHC 30.7 (L) 32.0 - 36.0 g/dL    RDW 14.4 11.5 - 14.5 %    Platelets 230 150 - 450 x10*3/uL    Neutrophils % 66.6 40.0 - 80.0 %    Immature Granulocytes %, Automated 0.7 0.0 - 0.9 %    Lymphocytes % 20.6 13.0 - 44.0 %    Monocytes % 9.1 2.0 - 10.0 %    Eosinophils % 2.5 0.0 - 6.0 %    Basophils % 0.5 0.0 - 2.0 %    Neutrophils Absolute 5.10 1.60 - 5.50 x10*3/uL    Immature Granulocytes Absolute, Automated 0.05 0.00 - 0.50 x10*3/uL    Lymphocytes Absolute 1.58 0.80 - 3.00 x10*3/uL    Monocytes Absolute 0.70 0.05 - 0.80 x10*3/uL    Eosinophils Absolute 0.19 0.00 - 0.40 x10*3/uL    Basophils Absolute 0.04 0.00 - 0.10 x10*3/uL   Comprehensive Metabolic Panel   Result Value Ref Range    Glucose 101 (H) 74 - 99 mg/dL    Sodium 143 136 - 145 mmol/L    Potassium 3.5 3.5 - 5.3 mmol/L    Chloride 106 98 - 107 mmol/L    Bicarbonate 31 21 - 32 mmol/L    Anion Gap 10 10 - 20 mmol/L    Urea Nitrogen 11 6 - 23 mg/dL    Creatinine 0.86 0.50 - 1.05 mg/dL    eGFR 70 >60 mL/min/1.73m*2    Calcium 8.7 8.6 - 10.3 mg/dL    Albumin 3.6 3.4 - 5.0 g/dL    Alkaline Phosphatase 94 33 - 136 U/L    Total Protein 6.3 (L) 6.4 - 8.2 g/dL    AST 14 9 - 39 U/L    Bilirubin, Total 0.5 0.0 - 1.2 mg/dL    ALT 8 7 - 45 U/L   Magnesium   Result Value Ref Range    Magnesium 2.09 1.60 - 2.40 mg/dL   Protime-INR   Result Value Ref Range    Protime 11.3 9.8 - 12.8 seconds    INR 1.0 0.9 - 1.1   D-dimer, VTE Exclusion   Result Value Ref Range    D-Dimer, Quantitative VTE Exclusion 1,247 (H) <=500 ng/mL FEU   Troponin I, High Sensitivity, Initial   Result Value Ref Range    Troponin I, High Sensitivity 4 0 - 13 ng/L   ECG 12 lead   Result Value Ref Range     Ventricular Rate 55 BPM    Atrial Rate 55 BPM    [place] Interval 130 ms    QRS Duration 88 ms    QT Interval 476 ms    QTC Calculation(Bazett) 455 ms    P Axis 43 degrees    R Axis 45 degrees    T Axis 17 degrees    QRS Count 9 beats    Q Onset 220 ms    P Onset 155 ms    P Offset 209 ms    T Offset 458 ms    QTC Fredericia 462 ms   Troponin, High Sensitivity, 1 Hour   Result Value Ref Range    Troponin I, High Sensitivity 4 0 - 13 ng/L   TSH with reflex to Free T4 if abnormal   Result Value Ref Range    Thyroid Stimulating Hormone 0.88 0.44 - 3.98 mIU/L   Osmolality   Result Value Ref Range    Osmolality, Serum 301 (H) 280 - 300 mOsm/kg   Urinalysis with Reflex Microscopic   Result Value Ref Range    Color, Urine Yellow Straw, Yellow    Appearance, Urine Hazy (N) Clear    Specific Gravity, Urine 1.051 (N) 1.005 - 1.035    pH, Urine 6.0 5.0, 5.5, 6.0, 6.5, 7.0, 7.5, 8.0    Protein, Urine 30 (1+) (N) NEGATIVE mg/dL    Glucose, Urine NEGATIVE NEGATIVE mg/dL    Blood, Urine NEGATIVE NEGATIVE    Ketones, Urine NEGATIVE NEGATIVE mg/dL    Bilirubin, Urine NEGATIVE NEGATIVE    Urobilinogen, Urine <2.0 <2.0 mg/dL    Nitrite, Urine POSITIVE (A) NEGATIVE    Leukocyte Esterase, Urine MODERATE (2+) (A) NEGATIVE   Sodium, Urine Random   Result Value Ref Range    Sodium, Urine Random 115 mmol/L    Creatinine, Urine Random 144.1 20.0 - 320.0 mg/dL    Sodium/Creatinine Ratio 80 Not established. mmol/g Creat   Microscopic Only, Urine   Result Value Ref Range    WBC, Urine 21-50 (A) 1-5, NONE /HPF    RBC, Urine 11-20 (A) NONE, 1-2, 3-5 /HPF    Squamous Epithelial Cells, Urine 1-9 (SPARSE) Reference range not established. /HPF    Bacteria, Urine 4+ (A) NONE SEEN /HPF   PST Top   Result Value Ref Range    Extra Tube Hold for add-ons.    CBC   Result Value Ref Range    WBC 7.5 4.4 - 11.3 x10*3/uL    nRBC 0.0 0.0 - 0.0 /100 WBCs    RBC 4.09 4.00 - 5.20 x10*6/uL    Hemoglobin 11.7 (L) 12.0 - 16.0 g/dL    Hematocrit 37.6 36.0 - 46.0 %    MCV  92 80 - 100 fL    MCH 28.6 26.0 - 34.0 pg    MCHC 31.1 (L) 32.0 - 36.0 g/dL    RDW 14.4 11.5 - 14.5 %    Platelets 212 150 - 450 x10*3/uL   Renal function panel   Result Value Ref Range    Glucose 89 74 - 99 mg/dL    Sodium 143 136 - 145 mmol/L    Potassium 3.7 3.5 - 5.3 mmol/L    Chloride 110 (H) 98 - 107 mmol/L    Bicarbonate 28 21 - 32 mmol/L    Anion Gap 9 (L) 10 - 20 mmol/L    Urea Nitrogen 11 6 - 23 mg/dL    Creatinine 0.74 0.50 - 1.05 mg/dL    eGFR 84 >60 mL/min/1.73m*2    Calcium 8.2 (L) 8.6 - 10.3 mg/dL    Phosphorus 3.4 2.5 - 4.9 mg/dL    Albumin 3.4 3.4 - 5.0 g/dL   Magnesium   Result Value Ref Range    Magnesium 2.05 1.60 - 2.40 mg/dL   Acute Toxicology Panel, Blood   Result Value Ref Range    Acetaminophen <10.0 10.0 - 30.0 ug/mL    Salicylate  <3 4 - 20 mg/dL    Alcohol <10 <=10 mg/dL   Troponin I, High Sensitivity   Result Value Ref Range    Troponin I, High Sensitivity 5 0 - 13 ng/L     CT angio chest for pulmonary embolism    Result Date: 1/8/2024  Interpreted By:  Ruben Whitten, STUDY: CT ANGIO CHEST FOR PULMONARY EMBOLISM;  1/8/2024 5:50 pm   INDICATION: Signs/Symptoms:syncope, elevated dimer, also further elucidation of lung mass seen on c spine CT.   COMPARISON: None.   ACCESSION NUMBER(S): [id number]   ORDERING CLINICIAN: JOE ESQUIVEL   TECHNIQUE: Contiguous axial images of the chest were obtained after the intravenous administration of 60 mL Omnipaque 350 contrast using angiographic PE protocol. Coronal and sagittal reformatted images were reconstructed from the axial data. MIP images were created on an independent workstation and reviewed.   FINDINGS: PULMONARY ARTERIES: Adequate opacification to the level of the mid segmental arteries. The distal segmental and subsegmental arteries, particularly in the left upper lobe limited by mixing artifact and volume loss/consolidation. No filling defect to suggest pulmonary embolus in the visualized pulmonary arteries. The main pulmonary artery is  normal in diameter. There is reflux of contrast into the hepatic venous circuit suggesting impaired right heart function.   HEART: Normal in size. Mild to moderate triple-vessel coronary vascular calcifications. No significant pericardial effusion.   VESSELS: Normal caliber aorta without dissection. Moderate atherosclerotic calcifications of the aortic arch and descending thoracic aorta.   MEDIASTINUM AND LYMPH NODES: Enlarged prevascular lymph nodes measuring up to 2.2 x 1.6 cm. The visualized thyroid is within normal limits. No pneumomediastinum. The esophagus appears within normal limits.   LUNG, AIRWAYS, AND PLEURA: The trachea and mainstem bronchi are patent. The left upper lobe bronchi are occluded proximally. Dense consolidation involving the majority of the left upper lobe with associated volume loss. There is a relatively low-density region centrally within the consolidation which appears to exert mass effect upon the adjacent bronchovascular structures, raising concern for underlying mass. Paraseptal and centrilobular emphysematous changes in the lung apices. No pleural effusion or pneumothorax.   OSSEOUS STRUCTURES: No acute osseous abnormality.   CHEST WALL SOFT TISSUES: 1.2 cm nodule in the superficial subcutaneous soft tissues of the midline upper chest, possibly a sebaceous cyst. 0.8 cm nodule in the subcutaneous fat of the medial aspect of the left breast. Recommend mammographic correlation.   UPPER ABDOMEN/OTHER: Probable enlarged lymph node in the gastrohepatic ligament, suboptimally visualized. Diffuse thickening/enlargement of the bilateral adrenal glands concerning for adrenal metastases.       1. No evidence of pulmonary embolus to the level of the mid segmental arteries. The distal segmental arteries are suboptimally assessed as detailed above. 2. Dense left upper lobe consolidation with occlusion of the left upper lobe bronchi proximally. Areas of relatively low-density which appear to  displace the adjacent bronchovascular structures within the central part of the consolidation raising concern for underlying mass. There is likely superimposed volume loss and postobstructive pneumonia. 3. Enlarged prevascular lymph nodes measuring up to 2.2 cm. Prominent right paratracheal nodes measuring up to 1 cm. 4. Partially visualized bilateral adrenal masses measuring at least 4.4 cm on the left and 2.8 cm in the right concerning for adrenal metastases. Recommend dedicated CT of the abdomen and pelvis. 5. A few scattered nodularities within the left breast, recommend mammographic correlation.   MACRO: Critical Finding:  See findings. Notification was initiated on 1/8/2024 at 6:21 pm by  Ruben Whitten.  (**-YCF-**) Instructions:   Signed by: Ruben Whitten 1/8/2024 6:22 PM Dictation workstation:   [id number]    XR chest 1 view    Result Date: 1/8/2024  Interpreted By:  Tobias Feliciano, STUDY: XR CHEST 1 VIEW  1/8/2024 3:21 pm   INDICATION: Signs/Symptoms:Chest Pain   COMPARISON: 06/22/2018   ACCESSION NUMBER(S): [id number]   ORDERING CLINICIAN: JOE ESQUIVEL   TECHNIQUE: A single AP portable radiograph of the chest was obtained.   FINDINGS: Multiple cardiac monitoring leads are seen over the chest.  A large masslike opacity is seen over the left upper lung and may represent mass and/or pneumonia. No pneumothorax is identified. The cardiac silhouette is within normal limits for size.       Masslike opacity over the left upper lung, as above. Further evaluation with CT of the chest is recommended.   MACRO: None.   Signed by: Tobias Feliciano 1/8/2024 3:32 PM Dictation workstation:   [id number]    ECG 12 lead    Result Date: 1/8/2024  Sinus bradycardia Otherwise normal ECG When compared with ECG of 30-DEC-2021 13:51, ST no longer elevated in Inferior leads T wave inversion no longer evident in Inferior leads T wave inversion less evident in Anterolateral leads    CT cervical spine wo IV contrast    Result Date:  1/8/2024  Interpreted By:  Tobias Feliciano, STUDY: CT CERVICAL SPINE WO IV CONTRAST;  1/8/2024 2:42 pm   INDICATION: Signs/Symptoms:syncope, fall with head trauma.   COMPARISON: None.   ACCESSION NUMBER(S): [id number]   ORDERING CLINICIAN: JOE ESQUIVEL   TECHNIQUE: Contiguous axial CT images were obtained at  2 mm slice thickness through the cervical spine without contrast administration. The images were then reconstructed in the coronal and sagittal planes.   FINDINGS: There is no CT evidence of acute fracture identified. No prevertebral soft tissue swelling is identified.   ALIGNMENT: The vertebral bodies are well aligned without evidence of subluxation.   VERTEBRAE/DISC SPACES: Moderate disc space narrowing and bridging osteophyte formation is seen at the C5-6 and C6-7 levels. Mild facet degenerative changes are seen throughout the cervical spine.   C2-3: There is no significant neural foraminal or central canal narrowing identified.   C3-4: There is no significant neural foraminal or central canal narrowing identified.   C4-5: There is no significant neural foraminal or central canal narrowing identified.   C5-6: Moderate to severe neural foraminal narrowing is seen bilaterally. No significant central canal narrowing is seen.   C6-7: Moderate to severe neural foraminal narrowing is seen bilaterally. No significant central canal narrowing is seen.   C7-T1: Mild neural foraminal narrowing is seen bilaterally. No significant central canal narrowing is seen.   ADDITIONAL FINDINGS: Evaluation of the visualized soft tissues of the neck is limited by the lack of intravenous contrast. A masslike density is seen at the anterior aspect of the left upper lung. Dense atherosclerotic calcifications are seen in the carotid bulbs bilaterally.       1.  No CT evidence of acute fracture. 2.  Degenerative changes throughout the cervical spine, as above. 3. Masslike density in the left lung apex, as above. Further evaluation with  CT of the chest is recommended.   Signed by: Tobias Feliciano 1/8/2024 3:06 PM Dictation workstation:   [id number]    CT head wo IV contrast    Result Date: 1/8/2024  Interpreted By:  Tobias Feliciano, STUDY: CT HEAD WO IV CONTRAST; 1/8/2024 2:42 pm   INDICATION: Signs/Symptoms:syncope, fall with head trauma.   COMPARISON: CT head dated 06/17/2018   ACCESSION NUMBER(S): [id number]   ORDERING CLINICIAN: JOE ESQUIVEL   TECHNIQUE: Contiguous axial CT images were obtained through the head at 5 mm slice thickness without contrast administration.   FINDINGS: INTRACRANIAL: Mild diffuse volume loss is seen bilaterally. Mild periventricular white matter changes are seen. Encephalomalacia is seen in the posterior right occipital lobe, consistent with prior infarct. The grey-white differentiation is otherwise intact. There is no mass effect or midline shift. There is no extraaxial fluid collection. There is no intracranial hemorrhage.  The calvarium  is unremarkable.   EXTRACRANIAL: Visualized paranasal sinuses and mastoids are clear.       1. Diffuse volume loss and periventricular white matter changes, most consistent with small vessel ischemic disease. 2. No evidence of acute cortical infarct or intracranial hemorrhage.     MACRO: None   Signed by: Tobias Feliciano 1/8/2024 3:02 PM Dictation workstation:   [id number]       Assessment/Plan      Claude is a 78 year old female with PMH of H. Flu meningitis, CAD s/p percutaneous coronary angioplasty, HDL, hypothyroidism, and anxiety presenting with chief complaint of syncope and collapse with head strike witnessed. TTE pending to determine etiology of syncope. CTA of chest and chest-xray revealed left upper lung mass. Patient is asymptomatic.    Today, patient is hemodynamically stable. CBC and RFP this morning were unremarkable. Mg, acute toxicology panel, and troponin all normal. UA positive for nitrates and leukocytes, urine culture pending. PE was unremarkable for any focal  neurologic or cardiac findings.      Principal Problem:    Mass of left lung  Active Problems:    Benign essential hypertension    CAD S/P percutaneous coronary angioplasty    Gastroesophageal reflux disease    Generalized anxiety disorder    #Syncope  -Orthostatic vitals negative, low-suspicion for orthostatic hypotension  -Echo in 2022 showed no significant LVH or LV dilation with preserved systolic dysfunction, EF 65% without wall motion abnormalities, aortic valve normal with no stenosis or insufficiency.  -Patient has a history of STEMI in 2021  -Pending TTE  -AM cortisol pending  -On telemetry    #Upper left lung mass concerning for lung malignancy  #Bilateral adrenal masses  -Patient is asymptomatic. No respiratory distress on exam, with no supplemental oxygen requirement   - CT head head/spine wo IV contrast shows no fracture nor acute signs of hemorrhage  - Chest x-ray and CTA chest reveals masslike density in the left lung apex  -CT abdomen/pelvis pending for bilateral adrenal masses  -MR brain w and wo IV contrast ordered and showed no evidence of metastatic disease within the brain  -Pulm consulted and recommended biopsy of adrenal gland via IR to prevent need for multiple biopsies. If adrenal biopsy shows lung cancer then will not need biopsy. Per pulm, bronchoscopy will be evaluated in setting of lung collapse to investigate mucus plug or other obstructive cause   -IR consulted  -Patient is NPO    #Elevated D-Dimer  -Elevated at 1,247, likely due to malignancy  -CTA PE negative for pulmonary embolism, physical exam negative for evidence suggestive of VTE   --CT head w/out con negative for intracranial hemorrhage and physical exam negative focal deficits   -CBC today is unremarkable    #HTN  #HLD  #CAD s/p PCI (2021)  -Patient denies chest pain or SOB on physical exam  -Of clinical record shows patient had stent placed to RCA  -Continue patient's home medications as appropriate  -ASA 81mg held for  adrenal gland biopsy via IR    #Hypothyroidism  -TSH 0.88  -We will continue patient's home Synthroid 75 mcg daily    #GERD  #General anxiety  -Will continue home meds as appropriate      Diet: NPO  Telemetry  Maintenance fluids: NS   Consults: Pulmonology and IR     CODE STATUS: DNR CCA DNI      Filippo Gordon, MS3    Senior resident addendum  Patient is a 78-year-old female with a past history of haemophilus influenza and meningitis 2019, CAD status post PCI 2021, hyperlipidemia, hypothyroidism, anxiety who presented to [facility] 1/8/2024 with a complaints of transient syncope lasting for 45 seconds followed by 1 minute of postevent confusion without any prodrome symptoms.  She did report hitting her head during this episode.  Events were witnessed by grandson at home.  Orthostatic vitals were negative and CAT scan of the head and spine did not show any acute pathologies.  Patient has bradycardia at 45-55, sinus rhythm and reported that she has baseline bradycardic.    1.Transient syncope: Cardiogenic (in setting of bradycardia and history of CAD) versus neurogenic-vasovagal  We will continue telemetry monitoring  Follow-up echo  No focal neuro signs  A.m. cortisol was 15, will repeat a.m. cortisol tomorrow    2.  Patient has had a new finding of opacity in the left upper lobe suggestive of mass.  She also had findings of bilateral adrenal masses.  We consulted IR who deferred adrenal biopsy for now, will need to hold aspirin for at least 5 days for the procedure  Pulmonary was consulted as well, will try for bronchoscopy tomorrow-n.p.o. 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  Abdominal aortic aneurysm slightly increased in size from 3.2 to 3.5 cm compared to 5 years ago.  We will talk with Dr. Garcia from oncology tomorrow and consider inpatient consult and establish oncology care for  outpatient.  Can you holding aspirin    3.  Had an elevated D-dimer, CTA chest ruled out pulmonary embolism  D-dimer likely elevated in the setting of reactive changes because of possible existing malignancy.    Rest of the plan as per medical student's note above.    Patient seen and examined independently from medical student  The note as shown as above has been edited to reflect my input.    Marie Toro MD  PGY-3

## 2024-01-09 NOTE — CARE PLAN
Problem: Pain  Goal: My pain/discomfort is manageable  Outcome: Progressing     Problem: Safety  Goal: Patient will be injury free during hospitalization  Outcome: Progressing     Problem: Daily Care  Goal: Daily care needs are met  Outcome: Progressing   The patient's goals for the shift include  get a good nights rest and sleep    The clinical goals for the shift include maintain safety, be free of falls    Patient had uneventful night, reports she did not sleep too well but occasionally dosed off here and there. Patient NPO since MN for possible lung biopsy. Patient white board updated of future testing including CT Abdomen, pulmonology consult, and possible lung biopsy. Patient reports no pain, no shortness of breath. Patient bed alarm remains activated and audible.

## 2024-01-09 NOTE — H&P
History Of Present Illness  Claude Monaco is a 78 y.o. 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 Patient does not have memory of the event but passed out while standing and fell to the ground, striking left side of the head. Event was witnessed by family member. She was unconscious for approximately 45 seconds, no reported seizure-like activity, no tongue biting, no bladder or bowel incontinence but it is reported the she appeared pale.  Standing in the kitchen preparing her breakfast preforming her daily task, walking around her home, and in returning to the kitchen from the hallway she became dizzy causing to stand still for a moment until dizziness resolved. She continued toward the stove falling backwards--only remembering being woken up by her grandson.  There was no sudden change in position prior to the event. Denies any prodromes such as chest pain, aura, blurry vision or heart palpitation. Patient was seen and examined in the ER with daughters at bedside. She was alert and oriented denying any cp, abdominal pain, n/v, or urinary incontinence. She had similar episodes when she had spinal meningitis (June, 2019) s/p treatment and had unsteady gait. Patient has extensive smoking history but no reported COPD or oxygen requirement. Denies recent medication change or family history of sudden cardiac death.     ED course:  Vitals: 36.1, /61, HR 54, RR 18, SpO2 99% on room air  CBC and CMP unremarkable, PT-INR 11.3/1.0, Trop 4, D-dimer 1247  Imaging: no evidence of PE (CTA); dense left upper lobe consolidation (CTA, x-ray), no evidence of acute fracture, acute cortical infarct or intracranial hemorrhage (CT spine, head)  EKG: sinus bradycardia, ventricular rate 55 bpm, CT interval 130ms, QTC 455ms, grossly unchanged compared to prior      Past Medical History  She has no past medical history on file.    Surgical History  She has a past surgical history that includes Other surgical history (04/12/2022); Other surgical history (04/12/2022); Other surgical history (04/12/2022); Other surgical history (04/12/2022); and Other surgical history (12/27/2022).     Social History  Patient has extensive smoking history    Family History  Father - esophageal cancer, formerly smoked cigarettes and drank alcohol     Allergies  Vancomycin    Review of Systems   Constitutional:  Negative for chills and fever.   HENT:  Positive for congestion.    Eyes:  Positive for pain. Negative for photophobia and visual disturbance.   Respiratory:  Positive for cough. Negative for chest tightness.    Cardiovascular:  Negative for chest pain and leg swelling.   Gastrointestinal:  Negative for abdominal pain.   Genitourinary:  Negative for frequency and urgency.   Musculoskeletal:  Negative for neck pain and neck stiffness.   Neurological:  Positive for syncope. Negative for seizures, light-headedness and headaches.   Psychiatric/Behavioral:  Negative for confusion.         Physical Exam  HENT:      Head: Normocephalic. No abrasion or laceration.      Comments: 5x5cm Left occipital parietal hematoma  Eyes:      Extraocular Movements: Extraocular movements intact.      Pupils: Pupils are equal, round, and reactive to light.   Cardiovascular:      Rate and Rhythm: Regular rhythm. Bradycardia present.   Pulmonary:      Effort: Pulmonary effort is normal.      Breath sounds: Normal breath sounds.   Abdominal:      General: Abdomen is flat.      Palpations: Abdomen is soft.   Musculoskeletal:         General: No swelling. Normal range of motion.      Cervical back: Normal range of motion. No rigidity or tenderness.      Right lower leg: No edema.      Left lower leg: No edema.   Skin:     General: Skin is warm and dry.   Neurological:      General: No focal deficit present.       Mental Status: She is oriented to person, place, and time.   Psychiatric:         Mood and Affect: Mood normal.          Last Recorded Vitals  /61 (BP Location: Left arm, Patient Position: Lying)   Pulse 62   Temp 36 °C (96.8 °F) (Temporal)   Resp 18   Wt 68.9 kg (152 lb)   SpO2 93%     Relevant Results    Results for orders placed or performed during the hospital encounter of 01/08/24 (from the past 24 hour(s))   CBC and Auto Differential   Result Value Ref Range    WBC 7.7 4.4 - 11.3 x10*3/uL    nRBC 0.0 0.0 - 0.0 /100 WBCs    RBC 4.41 4.00 - 5.20 x10*6/uL    Hemoglobin 12.4 12.0 - 16.0 g/dL    Hematocrit 40.4 36.0 - 46.0 %    MCV 92 80 - 100 fL    MCH 28.1 26.0 - 34.0 pg    MCHC 30.7 (L) 32.0 - 36.0 g/dL    RDW 14.4 11.5 - 14.5 %    Platelets 230 150 - 450 x10*3/uL    Neutrophils % 66.6 40.0 - 80.0 %    Immature Granulocytes %, Automated 0.7 0.0 - 0.9 %    Lymphocytes % 20.6 13.0 - 44.0 %    Monocytes % 9.1 2.0 - 10.0 %    Eosinophils % 2.5 0.0 - 6.0 %    Basophils % 0.5 0.0 - 2.0 %    Neutrophils Absolute 5.10 1.60 - 5.50 x10*3/uL    Immature Granulocytes Absolute, Automated 0.05 0.00 - 0.50 x10*3/uL    Lymphocytes Absolute 1.58 0.80 - 3.00 x10*3/uL    Monocytes Absolute 0.70 0.05 - 0.80 x10*3/uL    Eosinophils Absolute 0.19 0.00 - 0.40 x10*3/uL    Basophils Absolute 0.04 0.00 - 0.10 x10*3/uL   Comprehensive Metabolic Panel   Result Value Ref Range    Glucose 101 (H) 74 - 99 mg/dL    Sodium 143 136 - 145 mmol/L    Potassium 3.5 3.5 - 5.3 mmol/L    Chloride 106 98 - 107 mmol/L    Bicarbonate 31 21 - 32 mmol/L    Anion Gap 10 10 - 20 mmol/L    Urea Nitrogen 11 6 - 23 mg/dL    Creatinine 0.86 0.50 - 1.05 mg/dL    eGFR 70 >60 mL/min/1.73m*2    Calcium 8.7 8.6 - 10.3 mg/dL    Albumin 3.6 3.4 - 5.0 g/dL    Alkaline Phosphatase 94 33 - 136 U/L    Total Protein 6.3 (L) 6.4 - 8.2 g/dL    AST 14 9 - 39 U/L    Bilirubin, Total 0.5 0.0 - 1.2 mg/dL    ALT 8 7 - 45 U/L   Magnesium   Result Value Ref Range     Magnesium 2.09 1.60 - 2.40 mg/dL   Protime-INR   Result Value Ref Range    Protime 11.3 9.8 - 12.8 seconds    INR 1.0 0.9 - 1.1   D-dimer, VTE Exclusion   Result Value Ref Range    D-Dimer, Quantitative VTE Exclusion 1,247 (H) <=500 ng/mL FEU   Troponin I, High Sensitivity, Initial   Result Value Ref Range    Troponin I, High Sensitivity 4 0 - 13 ng/L   ECG 12 lead   Result Value Ref Range    Ventricular Rate 55 BPM    Atrial Rate 55 BPM    MT Interval 130 ms    QRS Duration 88 ms    QT Interval 476 ms    QTC Calculation(Bazett) 455 ms    P Axis 43 degrees    R Axis 45 degrees    T Axis 17 degrees    QRS Count 9 beats    Q Onset 220 ms    P Onset 155 ms    P Offset 209 ms    T Offset 458 ms    QTC Fredericia 462 ms   Troponin, High Sensitivity, 1 Hour   Result Value Ref Range    Troponin I, High Sensitivity 4 0 - 13 ng/L   TSH with reflex to Free T4 if abnormal   Result Value Ref Range    Thyroid Stimulating Hormone 0.88 0.44 - 3.98 mIU/L      CT angio chest for pulmonary embolism    Result Date: 1/8/2024  Interpreted By:  Ruben Whitten, STUDY: CT ANGIO CHEST FOR PULMONARY EMBOLISM;  1/8/2024 5:50 pm   INDICATION: Signs/Symptoms:syncope, elevated dimer, also further elucidation of lung mass seen on c spine CT.   COMPARISON: None.   ACCESSION NUMBER(S): [id number]   ORDERING CLINICIAN: JOE ESQUIVEL   TECHNIQUE: Contiguous axial images of the chest were obtained after the intravenous administration of 60 mL Omnipaque 350 contrast using angiographic PE protocol. Coronal and sagittal reformatted images were reconstructed from the axial data. MIP images were created on an independent workstation and reviewed.   FINDINGS: PULMONARY ARTERIES: Adequate opacification to the level of the mid segmental arteries. The distal segmental and subsegmental arteries, particularly in the left upper lobe limited by mixing artifact and volume loss/consolidation. No filling defect to suggest pulmonary embolus in the visualized pulmonary  arteries. The main pulmonary artery is normal in diameter. There is reflux of contrast into the hepatic venous circuit suggesting impaired right heart function.   HEART: Normal in size. Mild to moderate triple-vessel coronary vascular calcifications. No significant pericardial effusion.   VESSELS: Normal caliber aorta without dissection. Moderate atherosclerotic calcifications of the aortic arch and descending thoracic aorta.   MEDIASTINUM AND LYMPH NODES: Enlarged prevascular lymph nodes measuring up to 2.2 x 1.6 cm. The visualized thyroid is within normal limits. No pneumomediastinum. The esophagus appears within normal limits.   LUNG, AIRWAYS, AND PLEURA: The trachea and mainstem bronchi are patent. The left upper lobe bronchi are occluded proximally. Dense consolidation involving the majority of the left upper lobe with associated volume loss. There is a relatively low-density region centrally within the consolidation which appears to exert mass effect upon the adjacent bronchovascular structures, raising concern for underlying mass. Paraseptal and centrilobular emphysematous changes in the lung apices. No pleural effusion or pneumothorax.   OSSEOUS STRUCTURES: No acute osseous abnormality.   CHEST WALL SOFT TISSUES: 1.2 cm nodule in the superficial subcutaneous soft tissues of the midline upper chest, possibly a sebaceous cyst. 0.8 cm nodule in the subcutaneous fat of the medial aspect of the left breast. Recommend mammographic correlation.   UPPER ABDOMEN/OTHER: Probable enlarged lymph node in the gastrohepatic ligament, suboptimally visualized. Diffuse thickening/enlargement of the bilateral adrenal glands concerning for adrenal metastases.       1. No evidence of pulmonary embolus to the level of the mid segmental arteries. The distal segmental arteries are suboptimally assessed as detailed above. 2. Dense left upper lobe consolidation with occlusion of the left upper lobe bronchi proximally. Areas of  relatively low-density which appear to displace the adjacent bronchovascular structures within the central part of the consolidation raising concern for underlying mass. There is likely superimposed volume loss and postobstructive pneumonia. 3. Enlarged prevascular lymph nodes measuring up to 2.2 cm. Prominent right paratracheal nodes measuring up to 1 cm. 4. Partially visualized bilateral adrenal masses measuring at least 4.4 cm on the left and 2.8 cm in the right concerning for adrenal metastases. Recommend dedicated CT of the abdomen and pelvis. 5. A few scattered nodularities within the left breast, recommend mammographic correlation.   MACRO: Critical Finding:  See findings. Notification was initiated on 1/8/2024 at 6:21 pm by  Ruben Whitten.  (**-YCF-**) Instructions:   Signed by: Ruben Whitten 1/8/2024 6:22 PM Dictation workstation:   [id number]    XR chest 1 view    Result Date: 1/8/2024  Interpreted By:  Tobias Feliciano, STUDY: XR CHEST 1 VIEW  1/8/2024 3:21 pm   INDICATION: Signs/Symptoms:Chest Pain   COMPARISON: 06/22/2018   ACCESSION NUMBER(S): [id number]   ORDERING CLINICIAN: JOE ESQUIVEL   TECHNIQUE: A single AP portable radiograph of the chest was obtained.   FINDINGS: Multiple cardiac monitoring leads are seen over the chest.  A large masslike opacity is seen over the left upper lung and may represent mass and/or pneumonia. No pneumothorax is identified. The cardiac silhouette is within normal limits for size.       Masslike opacity over the left upper lung, as above. Further evaluation with CT of the chest is recommended.   MACRO: None.   Signed by: Tobias Feliciano 1/8/2024 3:32 PM Dictation workstation:   [id number]    ECG 12 lead    Result Date: 1/8/2024  Sinus bradycardia Otherwise normal ECG When compared with ECG of 30-DEC-2021 13:51, ST no longer elevated in Inferior leads T wave inversion no longer evident in Inferior leads T wave inversion less evident in Anterolateral leads    CT cervical  spine wo IV contrast    Result Date: 1/8/2024  Interpreted By:  Tobias Feliciano, STUDY: CT CERVICAL SPINE WO IV CONTRAST;  1/8/2024 2:42 pm   INDICATION: Signs/Symptoms:syncope, fall with head trauma.   COMPARISON: None.   ACCESSION NUMBER(S): [id number]   ORDERING CLINICIAN: JOE ESQUIVEL   TECHNIQUE: Contiguous axial CT images were obtained at  2 mm slice thickness through the cervical spine without contrast administration. The images were then reconstructed in the coronal and sagittal planes.   FINDINGS: There is no CT evidence of acute fracture identified. No prevertebral soft tissue swelling is identified.   ALIGNMENT: The vertebral bodies are well aligned without evidence of subluxation.   VERTEBRAE/DISC SPACES: Moderate disc space narrowing and bridging osteophyte formation is seen at the C5-6 and C6-7 levels. Mild facet degenerative changes are seen throughout the cervical spine.   C2-3: There is no significant neural foraminal or central canal narrowing identified.   C3-4: There is no significant neural foraminal or central canal narrowing identified.   C4-5: There is no significant neural foraminal or central canal narrowing identified.   C5-6: Moderate to severe neural foraminal narrowing is seen bilaterally. No significant central canal narrowing is seen.   C6-7: Moderate to severe neural foraminal narrowing is seen bilaterally. No significant central canal narrowing is seen.   C7-T1: Mild neural foraminal narrowing is seen bilaterally. No significant central canal narrowing is seen.   ADDITIONAL FINDINGS: Evaluation of the visualized soft tissues of the neck is limited by the lack of intravenous contrast. A masslike density is seen at the anterior aspect of the left upper lung. Dense atherosclerotic calcifications are seen in the carotid bulbs bilaterally.       1.  No CT evidence of acute fracture. 2.  Degenerative changes throughout the cervical spine, as above. 3. Masslike density in the left lung  apex, as above. Further evaluation with CT of the chest is recommended.   Signed by: Tobias Feliciano 1/8/2024 3:06 PM Dictation workstation:   [id number]    CT head wo IV contrast    Result Date: 1/8/2024  Interpreted By:  Tobias Feliciano, STUDY: CT HEAD WO IV CONTRAST; 1/8/2024 2:42 pm   INDICATION: Signs/Symptoms:syncope, fall with head trauma.   COMPARISON: CT head dated 06/17/2018   ACCESSION NUMBER(S): [id number]   ORDERING CLINICIAN: JOE ESQUIVEL   TECHNIQUE: Contiguous axial CT images were obtained through the head at 5 mm slice thickness without contrast administration.   FINDINGS: INTRACRANIAL: Mild diffuse volume loss is seen bilaterally. Mild periventricular white matter changes are seen. Encephalomalacia is seen in the posterior right occipital lobe, consistent with prior infarct. The grey-white differentiation is otherwise intact. There is no mass effect or midline shift. There is no extraaxial fluid collection. There is no intracranial hemorrhage.  The calvarium  is unremarkable.   EXTRACRANIAL: Visualized paranasal sinuses and mastoids are clear.       1. Diffuse volume loss and periventricular white matter changes, most consistent with small vessel ischemic disease. 2. No evidence of acute cortical infarct or intracranial hemorrhage.     MACRO: None   Signed by: Tobias Feliciano 1/8/2024 3:02 PM Dictation workstation:   [id number]      Assessment/Plan   Principal Problem:    Mass of left lung  Active Problems:    Benign essential hypertension    CAD S/P percutaneous coronary angioplasty    Gastroesophageal reflux disease    Generalized anxiety disorder    Claude Monaco is a 78 y.o. female presenting with PMH of H. Flu meningitis, CAD s/p percutaneous coronary angioplasty, HDL, hypothyroidism, anxiety presenting with chief complaint of syncope and collapse with head strike witnessed. Physical exam is grossly unremarkable for any focal neurologic or cardiac findings.  CT head without contrast and CTA of  head and neck are negative for any acute intracranial pathology. However, CTA of chest and chest-xray reveals left upper lung mass. Given patient's extensive smoking history, concern for undiagnosed lung malignancy. Pulmonary will be placed on consult for utility of bronchoscopy with biopsy.    Review of clinical record reveals patient had an echocardiogram 4/5/2022 revealed no significant LVH or LV dilatation with preserved systolic function with an estimated EF of 65% without apparent regional wall motion abnormalities.  Trileaflet aortic valve with normal structure and function with no stenosis or insufficiency.  No significant left or right atrial enlargement.    #Syncope  -Likely neurally mediated given stated hx, no chest pain worsening SOB or anginal equivalent.   -Will order orthostatic vital to assess volume status, concern for orthostatic hypotension-low suspicion   -pending orthostats may consider volume challenge.   -ordered AM cortisol   -EKG reviewed noted above.   -2D echocardiagram  -Consider b/l duplex of carotid, physical exam negative for carotid bruit   -Telemetry  -trend intake and output  -TSH WNL    #Upper left lung mass concerning for lung malignancy  #Bilateral adrenal masses   - No respiratory distress on exam, with no supplemental oxygen requirement.    - CT head head/spine wo IV contrast shows no fracture nor acute signs of hemorrhage  - Chest x-ray and CTA chest reveals masslike density in the left lung apex  - Pulmonology placed on consult  - CBC and CMP grossly remarkable with continue to trend  - Ordered dedicated CT abdomen pelvis to further assess adrenal masses concern for metastatic disease    #Elevated D-Dimer  -Lab significant for D-dimer of 1,247  -CTA PE negative for pulmonary embolism, physical exam negative for evidence suggestive of VTE  -Noting likely reactive in the setting head strike/trauma with scalp hematoma, INR 1  -CT head w/out con negative for intracranial  hemorrhage and physical exam negative focal deficits  -Skin exam benign with no evidence of acute petechiae or ecchymosis the exception of the scalp hematoma  -Will trend CBC    #Hypertension  #Hyperlipidemia  #CAD status post PCI (2021)  -On physical exam patient denies any chest pain, shortness of breath, or anginal equivalent  -Of clinical record shows patient had stent placed to RCA  -Continue patient's home medications as appropriate  -Will hold home ASA 81mg pending pulm recs.     #Hypothyroidism  -TSH 0.88  -We will continue patient's home Synthroid 75 mcg daily    #GERD  #General anxiety  -Will continue home meds as appropriate.       Diet: Regular   Telemetry  PT OT  Maintenance fluids: NS 75 mL/h x 1 L  Consults: Pulmonology    CODE STATUS: DNR CCA DNI    Dispo: Patient admitted for syncopal episode of unknown etiology found to have incidentaloma of left upper lung concerning for malignancy likely requiring 2-3 midnights.      Kayla Kahn, MS3      Patient seen and examined independently of medical student. The above documentation was reviewed by me and reflects my direct input.     Please excuse any grammatical errors or spelling as document was created with the use of voice recognition software.    Assessment and plan to be discussed with attending physician     Bharat Helm, DO  Internal Medicine, PGY-2

## 2024-01-09 NOTE — PROGRESS NOTES
Occupational Therapy                 Therapy Communication Note    Patient Name: Claude Monaco  MRN: [id number]  Today's Date: 1/9/2024     Discipline: Occupational Therapy    Missed Visit Reason: Pt. Has no OT needs at this time.  Will discontinue OT.      Missed Time: Attempt    Comment:

## 2024-01-09 NOTE — PROGRESS NOTES
01/09/24 0958   Discharge Planning   Living Arrangements Family members   Support Systems Family members   Assistance Needed no   Type of Residence Private residence   Home or Post Acute Services None   Patient expects to be discharged to: home   Transportation Needs   In the past 12 months, has lack of transportation kept you from medical appointments or from getting medications? no   In the past 12 months, has lack of transportation kept you from meetings, work, or from getting things needed for daily living? No     Met with patient and family at bedside. Patient lives at home with grandson in [place]. Patient is independent and does not use DME. Patient states her PCP is Rosalio Lee. Patient plans to return home at d/c, therapy evals are pending.

## 2024-01-09 NOTE — PROGRESS NOTES
Message left for patient to return call .  Labs entered.   Physical Therapy                 Therapy Communication Note    Patient Name: Claude Monaco  MRN: [id number]  Today's Date: 1/9/2024     Discipline: Physical Therapy    After evaluation was complete, RN called PT as pt's family was requesting RW for DC home. PT returned to pt's room at 14:57 to ensure pt could safely use RW within hospital room and to educate pt/family on safe fit of device. Pt demo's proper use of device and TCC was asked to order RW for homegoing. Nonbillable visit d/t <8 min spent with pt.

## 2024-01-09 NOTE — CONSULTS
Reason For Consult  Lung mass    History Of Present Illness  Claude Monaco is a 78 y.o. 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  This was witnessed and there was no seizure-like activity.  She denies having prior episodes that she can recall, but does states since her episode of meningitis in 2019 she has had an unsteady gait but cannot recall if she is ever passed out.  She does not have any kind of exertional dyspnea or shortness of breath, denies any wheezing or cough, denies hemoptysis, weight loss, chest pain, pleuritic chest pain, fevers, nocturnal sweats, or other concerning symptoms.  She does currently complain of headache and has cold rag over her head which appears to be helping she says.     Past Medical History  As above    Surgical History  Appendectomy, cataract surgery, ovarian surgery, PCI     Social History  1/2 ppd currently with a 50-60 smoking year history.    Family History  No family history on file.     Allergies  Ceftriaxone, Vancomycin, and Ceftizoxime    Review of Systems  12 point review of systems was done and negative except for stated in HPI.     Physical Exam  Constitutional: A&Ox4, elderly, NAD, resting comfortable   Head and Face: Atraumatic, normocephalic   Eyes: Normal external exam, EOMI  ENT: Normal external inspection of ears and nose. Oropharynx normal.  Cardiovascular: RRR, S1/S2, no murmurs, rubs, or gallops, radial pulses +2  Pulmonary: CTAB, no respiratory distress, no wheezing, rales or rhonchi, on RA  Abdomen: +BS, soft, non-tender, nondistended, no guarding rigidity or rebound tenderness, no masses noted  MSK: Negative for edema, No joint swelling, normal movements of all extremities.   Neuro: No focal deficits, normal motor function, normal sensation,  "follows all commands  Skin- No lesions, contusions, or erythema.  Psychiatric: Judgment intact. Appropriate mood, affect and behavior      Last Recorded Vitals  Blood pressure 148/73, pulse 57, temperature 36.9 °C (98.4 °F), temperature source Temporal, resp. rate 16, height 1.676 m (5' 6\"), weight 68.9 kg (152 lb), SpO2 98 %.    Relevant Results  Results for orders placed or performed during the hospital encounter of 01/08/24 (from the past 24 hour(s))   CBC and Auto Differential   Result Value Ref Range    WBC 7.7 4.4 - 11.3 x10*3/uL    nRBC 0.0 0.0 - 0.0 /100 WBCs    RBC 4.41 4.00 - 5.20 x10*6/uL    Hemoglobin 12.4 12.0 - 16.0 g/dL    Hematocrit 40.4 36.0 - 46.0 %    MCV 92 80 - 100 fL    MCH 28.1 26.0 - 34.0 pg    MCHC 30.7 (L) 32.0 - 36.0 g/dL    RDW 14.4 11.5 - 14.5 %    Platelets 230 150 - 450 x10*3/uL    Neutrophils % 66.6 40.0 - 80.0 %    Immature Granulocytes %, Automated 0.7 0.0 - 0.9 %    Lymphocytes % 20.6 13.0 - 44.0 %    Monocytes % 9.1 2.0 - 10.0 %    Eosinophils % 2.5 0.0 - 6.0 %    Basophils % 0.5 0.0 - 2.0 %    Neutrophils Absolute 5.10 1.60 - 5.50 x10*3/uL    Immature Granulocytes Absolute, Automated 0.05 0.00 - 0.50 x10*3/uL    Lymphocytes Absolute 1.58 0.80 - 3.00 x10*3/uL    Monocytes Absolute 0.70 0.05 - 0.80 x10*3/uL    Eosinophils Absolute 0.19 0.00 - 0.40 x10*3/uL    Basophils Absolute 0.04 0.00 - 0.10 x10*3/uL   Comprehensive Metabolic Panel   Result Value Ref Range    Glucose 101 (H) 74 - 99 mg/dL    Sodium 143 136 - 145 mmol/L    Potassium 3.5 3.5 - 5.3 mmol/L    Chloride 106 98 - 107 mmol/L    Bicarbonate 31 21 - 32 mmol/L    Anion Gap 10 10 - 20 mmol/L    Urea Nitrogen 11 6 - 23 mg/dL    Creatinine 0.86 0.50 - 1.05 mg/dL    eGFR 70 >60 mL/min/1.73m*2    Calcium 8.7 8.6 - 10.3 mg/dL    Albumin 3.6 3.4 - 5.0 g/dL    Alkaline Phosphatase 94 33 - 136 U/L    Total Protein 6.3 (L) 6.4 - 8.2 g/dL    AST 14 9 - 39 U/L    Bilirubin, Total 0.5 0.0 - 1.2 mg/dL    ALT 8 7 - 45 U/L   Magnesium "   Result Value Ref Range    Magnesium 2.09 1.60 - 2.40 mg/dL   Protime-INR   Result Value Ref Range    Protime 11.3 9.8 - 12.8 seconds    INR 1.0 0.9 - 1.1   D-dimer, VTE Exclusion   Result Value Ref Range    D-Dimer, Quantitative VTE Exclusion 1,247 (H) <=500 ng/mL FEU   Troponin I, High Sensitivity, Initial   Result Value Ref Range    Troponin I, High Sensitivity 4 0 - 13 ng/L   ECG 12 lead   Result Value Ref Range    Ventricular Rate 55 BPM    Atrial Rate 55 BPM    NM Interval 130 ms    QRS Duration 88 ms    QT Interval 476 ms    QTC Calculation(Bazett) 455 ms    P Axis 43 degrees    R Axis 45 degrees    T Axis 17 degrees    QRS Count 9 beats    Q Onset 220 ms    P Onset 155 ms    P Offset 209 ms    T Offset 458 ms    QTC Fredericia 462 ms   Troponin, High Sensitivity, 1 Hour   Result Value Ref Range    Troponin I, High Sensitivity 4 0 - 13 ng/L   TSH with reflex to Free T4 if abnormal   Result Value Ref Range    Thyroid Stimulating Hormone 0.88 0.44 - 3.98 mIU/L   Osmolality   Result Value Ref Range    Osmolality, Serum 301 (H) 280 - 300 mOsm/kg   Urinalysis with Reflex Microscopic   Result Value Ref Range    Color, Urine Yellow Straw, Yellow    Appearance, Urine Hazy (N) Clear    Specific Gravity, Urine 1.051 (N) 1.005 - 1.035    pH, Urine 6.0 5.0, 5.5, 6.0, 6.5, 7.0, 7.5, 8.0    Protein, Urine 30 (1+) (N) NEGATIVE mg/dL    Glucose, Urine NEGATIVE NEGATIVE mg/dL    Blood, Urine NEGATIVE NEGATIVE    Ketones, Urine NEGATIVE NEGATIVE mg/dL    Bilirubin, Urine NEGATIVE NEGATIVE    Urobilinogen, Urine <2.0 <2.0 mg/dL    Nitrite, Urine POSITIVE (A) NEGATIVE    Leukocyte Esterase, Urine MODERATE (2+) (A) NEGATIVE   Sodium, Urine Random   Result Value Ref Range    Sodium, Urine Random 115 mmol/L    Creatinine, Urine Random 144.1 20.0 - 320.0 mg/dL    Sodium/Creatinine Ratio 80 Not established. mmol/g Creat   Microscopic Only, Urine   Result Value Ref Range    WBC, Urine 21-50 (A) 1-5, NONE /HPF    RBC, Urine 11-20 (A)  NONE, 1-2, 3-5 /HPF    Squamous Epithelial Cells, Urine 1-9 (SPARSE) Reference range not established. /HPF    Bacteria, Urine 4+ (A) NONE SEEN /HPF   PST Top   Result Value Ref Range    Extra Tube Hold for add-ons.    CBC   Result Value Ref Range    WBC 7.5 4.4 - 11.3 x10*3/uL    nRBC 0.0 0.0 - 0.0 /100 WBCs    RBC 4.09 4.00 - 5.20 x10*6/uL    Hemoglobin 11.7 (L) 12.0 - 16.0 g/dL    Hematocrit 37.6 36.0 - 46.0 %    MCV 92 80 - 100 fL    MCH 28.6 26.0 - 34.0 pg    MCHC 31.1 (L) 32.0 - 36.0 g/dL    RDW 14.4 11.5 - 14.5 %    Platelets 212 150 - 450 x10*3/uL   Renal function panel   Result Value Ref Range    Glucose 89 74 - 99 mg/dL    Sodium 143 136 - 145 mmol/L    Potassium 3.7 3.5 - 5.3 mmol/L    Chloride 110 (H) 98 - 107 mmol/L    Bicarbonate 28 21 - 32 mmol/L    Anion Gap 9 (L) 10 - 20 mmol/L    Urea Nitrogen 11 6 - 23 mg/dL    Creatinine 0.74 0.50 - 1.05 mg/dL    eGFR 84 >60 mL/min/1.73m*2    Calcium 8.2 (L) 8.6 - 10.3 mg/dL    Phosphorus 3.4 2.5 - 4.9 mg/dL    Albumin 3.4 3.4 - 5.0 g/dL   Magnesium   Result Value Ref Range    Magnesium 2.05 1.60 - 2.40 mg/dL   Acute Toxicology Panel, Blood   Result Value Ref Range    Acetaminophen <10.0 10.0 - 30.0 ug/mL    Salicylate  <3 4 - 20 mg/dL    Alcohol <10 <=10 mg/dL   Troponin I, High Sensitivity   Result Value Ref Range    Troponin I, High Sensitivity 5 0 - 13 ng/L    CT angio chest for pulmonary embolism    Result Date: 1/8/2024  Interpreted By:  Ruben Whitten, STUDY: CT ANGIO CHEST FOR PULMONARY EMBOLISM;  1/8/2024 5:50 pm   INDICATION: Signs/Symptoms:syncope, elevated dimer, also further elucidation of lung mass seen on c spine CT.   COMPARISON: None.   ACCESSION NUMBER(S): [id number]   ORDERING CLINICIAN: JOE ESQUIVEL   TECHNIQUE: Contiguous axial images of the chest were obtained after the intravenous administration of 60 mL Omnipaque 350 contrast using angiographic PE protocol. Coronal and sagittal reformatted images were reconstructed from the axial data. MIP  images were created on an independent workstation and reviewed.   FINDINGS: PULMONARY ARTERIES: Adequate opacification to the level of the mid segmental arteries. The distal segmental and subsegmental arteries, particularly in the left upper lobe limited by mixing artifact and volume loss/consolidation. No filling defect to suggest pulmonary embolus in the visualized pulmonary arteries. The main pulmonary artery is normal in diameter. There is reflux of contrast into the hepatic venous circuit suggesting impaired right heart function.   HEART: Normal in size. Mild to moderate triple-vessel coronary vascular calcifications. No significant pericardial effusion.   VESSELS: Normal caliber aorta without dissection. Moderate atherosclerotic calcifications of the aortic arch and descending thoracic aorta.   MEDIASTINUM AND LYMPH NODES: Enlarged prevascular lymph nodes measuring up to 2.2 x 1.6 cm. The visualized thyroid is within normal limits. No pneumomediastinum. The esophagus appears within normal limits.   LUNG, AIRWAYS, AND PLEURA: The trachea and mainstem bronchi are patent. The left upper lobe bronchi are occluded proximally. Dense consolidation involving the majority of the left upper lobe with associated volume loss. There is a relatively low-density region centrally within the consolidation which appears to exert mass effect upon the adjacent bronchovascular structures, raising concern for underlying mass. Paraseptal and centrilobular emphysematous changes in the lung apices. No pleural effusion or pneumothorax.   OSSEOUS STRUCTURES: No acute osseous abnormality.   CHEST WALL SOFT TISSUES: 1.2 cm nodule in the superficial subcutaneous soft tissues of the midline upper chest, possibly a sebaceous cyst. 0.8 cm nodule in the subcutaneous fat of the medial aspect of the left breast. Recommend mammographic correlation.   UPPER ABDOMEN/OTHER: Probable enlarged lymph node in the gastrohepatic ligament, suboptimally  visualized. Diffuse thickening/enlargement of the bilateral adrenal glands concerning for adrenal metastases.       1. No evidence of pulmonary embolus to the level of the mid segmental arteries. The distal segmental arteries are suboptimally assessed as detailed above. 2. Dense left upper lobe consolidation with occlusion of the left upper lobe bronchi proximally. Areas of relatively low-density which appear to displace the adjacent bronchovascular structures within the central part of the consolidation raising concern for underlying mass. There is likely superimposed volume loss and postobstructive pneumonia. 3. Enlarged prevascular lymph nodes measuring up to 2.2 cm. Prominent right paratracheal nodes measuring up to 1 cm. 4. Partially visualized bilateral adrenal masses measuring at least 4.4 cm on the left and 2.8 cm in the right concerning for adrenal metastases. Recommend dedicated CT of the abdomen and pelvis. 5. A few scattered nodularities within the left breast, recommend mammographic correlation.   MACRO: Critical Finding:  See findings. Notification was initiated on 1/8/2024 at 6:21 pm by  Ruben Whitten.  (**-YCF-**) Instructions:   Signed by: Ruben Whitten 1/8/2024 6:22 PM Dictation workstation:   [id number]    XR chest 1 view    Result Date: 1/8/2024  Interpreted By:  Tobias Feliciano, STUDY: XR CHEST 1 VIEW  1/8/2024 3:21 pm   INDICATION: Signs/Symptoms:Chest Pain   COMPARISON: 06/22/2018   ACCESSION NUMBER(S): [id number]   ORDERING CLINICIAN: JOE ESQUIVEL   TECHNIQUE: A single AP portable radiograph of the chest was obtained.   FINDINGS: Multiple cardiac monitoring leads are seen over the chest.  A large masslike opacity is seen over the left upper lung and may represent mass and/or pneumonia. No pneumothorax is identified. The cardiac silhouette is within normal limits for size.       Masslike opacity over the left upper lung, as above. Further evaluation with CT of the chest is recommended.    MACRO: None.   Signed by: Tobias Feliciano 1/8/2024 3:32 PM Dictation workstation:   [id number]    ECG 12 lead    Result Date: 1/8/2024  Sinus bradycardia Otherwise normal ECG When compared with ECG of 30-DEC-2021 13:51, ST no longer elevated in Inferior leads T wave inversion no longer evident in Inferior leads T wave inversion less evident in Anterolateral leads    CT cervical spine wo IV contrast    Result Date: 1/8/2024  Interpreted By:  Tobias Feliciano, STUDY: CT CERVICAL SPINE WO IV CONTRAST;  1/8/2024 2:42 pm   INDICATION: Signs/Symptoms:syncope, fall with head trauma.   COMPARISON: None.   ACCESSION NUMBER(S): [id number]   ORDERING CLINICIAN: JOE ESQUIVEL   TECHNIQUE: Contiguous axial CT images were obtained at  2 mm slice thickness through the cervical spine without contrast administration. The images were then reconstructed in the coronal and sagittal planes.   FINDINGS: There is no CT evidence of acute fracture identified. No prevertebral soft tissue swelling is identified.   ALIGNMENT: The vertebral bodies are well aligned without evidence of subluxation.   VERTEBRAE/DISC SPACES: Moderate disc space narrowing and bridging osteophyte formation is seen at the C5-6 and C6-7 levels. Mild facet degenerative changes are seen throughout the cervical spine.   C2-3: There is no significant neural foraminal or central canal narrowing identified.   C3-4: There is no significant neural foraminal or central canal narrowing identified.   C4-5: There is no significant neural foraminal or central canal narrowing identified.   C5-6: Moderate to severe neural foraminal narrowing is seen bilaterally. No significant central canal narrowing is seen.   C6-7: Moderate to severe neural foraminal narrowing is seen bilaterally. No significant central canal narrowing is seen.   C7-T1: Mild neural foraminal narrowing is seen bilaterally. No significant central canal narrowing is seen.   ADDITIONAL FINDINGS: Evaluation of the  visualized soft tissues of the neck is limited by the lack of intravenous contrast. A masslike density is seen at the anterior aspect of the left upper lung. Dense atherosclerotic calcifications are seen in the carotid bulbs bilaterally.       1.  No CT evidence of acute fracture. 2.  Degenerative changes throughout the cervical spine, as above. 3. Masslike density in the left lung apex, as above. Further evaluation with CT of the chest is recommended.   Signed by: Tobias Feliciano 1/8/2024 3:06 PM Dictation workstation:   [id number]    CT head wo IV contrast    Result Date: 1/8/2024  Interpreted By:  Tobias Feliciano, STUDY: CT HEAD WO IV CONTRAST; 1/8/2024 2:42 pm   INDICATION: Signs/Symptoms:syncope, fall with head trauma.   COMPARISON: CT head dated 06/17/2018   ACCESSION NUMBER(S): [id number]   ORDERING CLINICIAN: JOE ESQUIVEL   TECHNIQUE: Contiguous axial CT images were obtained through the head at 5 mm slice thickness without contrast administration.   FINDINGS: INTRACRANIAL: Mild diffuse volume loss is seen bilaterally. Mild periventricular white matter changes are seen. Encephalomalacia is seen in the posterior right occipital lobe, consistent with prior infarct. The grey-white differentiation is otherwise intact. There is no mass effect or midline shift. There is no extraaxial fluid collection. There is no intracranial hemorrhage.  The calvarium  is unremarkable.   EXTRACRANIAL: Visualized paranasal sinuses and mastoids are clear.       1. Diffuse volume loss and periventricular white matter changes, most consistent with small vessel ischemic disease. 2. No evidence of acute cortical infarct or intracranial hemorrhage.     MACRO: None   Signed by: Tobias Feliciano 1/8/2024 3:02 PM Dictation workstation:   [id number]       Assessment/Plan     Patient is a 78 y.o. 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 Patient is asymptomatic from a respiratory prospective without wheeze, cough, or dyspnea.    #Left Lung mass  #Left upper lobe lung collapse   #B/l adrenal masses concerning for metastasis  #Current Smoker  -Recommend biopsy of Adrenal gland via IR   -Possible bronchoscopy for biopsy tomorrow if OR time available  -If bronch tomorrow will need to be NPO for 8 hours prior  -Hold any blood thinners  -Smoking cessation    Please wait for signature of attending for final recommendations.  Pritesh Leigh D.O.  Internal Medicine, PGY-1

## 2024-01-09 NOTE — NURSING NOTE
Resumed care of patient from CHARLY Bond. Patient in bed awake A/O 4. Bed locked In lowest position, bed alarm active and audible, call bell within reach. Assessment and vitals completed and documented per order.     0800 Family at bedside.    1200 MRI completed, CT completed, ECHO completed.      EOS: Pt turned independently this shift in bed and slept intermittently, Patient remained A/O4 throughout shift.. Patient given medications per orders, safety maintained, call light within reach at all times, bed alarm active and audible throughout shift.

## 2024-01-10 NOTE — NURSING NOTE
0750- Inquired to Dr Flores about the patient being able to take her medications prior to bronchoscopy and he said that was fine. Also inquired about holding the lovenox which he said was fine.     1000- Inquired to Dr Toro about patient being able to take a shower and tylenol for her headache and neck pain.    1320- Patient going down for bronchoscopy.    1600- Patient arrived back on unit. Per report from endoscopy it is to be expected that she may cough up some blood. Grandson at bedside and aware of this as well.    EOS- Patient weaned down to 2L from 4L after bronchoscopy. Family at bedside throughout the shift. Patient eating dinner with no problems. Safety maintained and call light within reach.

## 2024-01-10 NOTE — ANESTHESIA PROCEDURE NOTES
Airway  Date/Time: 1/10/2024 1:50 PM  Urgency: elective    Airway not difficult    Staffing  Performed: CRNA   Authorized by: LANDEN Chaidez    Performed by: BESSIE Chaidez-REBEKAH  Patient location during procedure: OR    Indications and Patient Condition  Indications for airway management: anesthesia  Spontaneous Ventilation: absent  Sedation level: deep  Preoxygenated: yes  Patient position: sniffing  Mask difficulty assessment: 0 - not attempted    Final Airway Details  Final airway type: endotracheal airway      Successful airway: ETT  Cuffed: yes   Successful intubation technique: video laryngoscopy ([place])  Facilitating devices/methods: intubating stylet  Blade: Kevin  Blade size: #3  ETT size (mm): 8.0  Cormack-Lehane Classification: grade I - full view of glottis  Placement verified by: chest auscultation, capnometry and palpation of cuff   Cuff volume (mL): 6  Measured from: lips  ETT to lips (cm): 21  Number of attempts at approach: 1

## 2024-01-10 NOTE — ANESTHESIA PROCEDURE NOTES
Peripheral IV  Date/Time: 1/10/2024 1:55 PM  Inserted by: BESSIE Chaidez-REBEKAH    Placement  Needle size: 20 G  Laterality: right  Location: hand  Local anesthetic: none  Site prep: chlorhexidine  Technique: anatomical landmarks  Attempts: 1

## 2024-01-10 NOTE — ANESTHESIA POSTPROCEDURE EVALUATION
Patient: Claude Monaco    Procedure Summary       Date: 01/10/24 Room / Location: [facility]    Anesthesia Start: 1343 Anesthesia Stop:     Procedure: BRONCHOSCOPY Diagnosis: Lung mass    Scheduled Providers: Georgina Herrera MD Responsible Provider: Naveen Abebe DO    Anesthesia Type: general ASA Status: 3            Anesthesia Type: general    Vitals Value Taken Time   /53 01/10/24 1451   Temp 36.0 01/10/24 1451   Pulse 51 01/10/24 1451   Resp 16 01/10/24 1451   SpO2 99 01/10/24 1451       Anesthesia Post Evaluation    Patient location during evaluation: PACU  Patient participation: complete - patient cannot participate  Level of consciousness: responsive to light touch and awake  Pain management: adequate  Airway patency: patent  Cardiovascular status: acceptable  Respiratory status: acceptable and face mask  Hydration status: acceptable  Postoperative Nausea and Vomiting: none  Comments: Handoff to PACU RN        No notable events documented.

## 2024-01-10 NOTE — ANESTHESIA PREPROCEDURE EVALUATION
Patient: Claude Monaco    Procedure Information       Date/Time: 01/10/24 1300    Scheduled providers: Georgina Herrera MD    Procedure: BRONCHOSCOPY    [place] [facility] [place] Problems   Cardiovascular   (+) Benign essential hypertension   (+) CAD S/P percutaneous coronary angioplasty   (+) Mixed hyperlipidemia      GI   (+) Gastroesophageal reflux disease      Neuro/Psych   (+) Generalized anxiety disorder      Eyes, Ears, Nose, and Throat   (+) Asymmetrical sensorineural hearing loss   (+) Mixed conductive and sensorineural hearing loss of both ears      Infectious Disease   (+) H. influenzae meningitis   (+) Meningitis, bacterial       Clinical information reviewed:                   NPO Detail:  No data recorded     Physical Exam    Airway  Mallampati: II  TM distance: >3 FB  Neck ROM: full     Cardiovascular - normal exam  Rhythm: regular  Rate: normal     Dental - normal exam     Pulmonary - normal exam     Abdominal - normal exam  Abdomen: soft             Anesthesia Plan    History of general anesthesia?: yes  History of complications of general anesthesia?: no    ASA 3     general     The patient is not a current smoker.  Patient was previously instructed to abstain from smoking on day of procedure.  Patient did not smoke on day of procedure.  Education provided regarding risk of obstructive sleep apnea.  intravenous induction   Postoperative administration of opioids is intended.  Anesthetic plan and risks discussed with patient.

## 2024-01-10 NOTE — CARE PLAN
The patient's goals for the shift include      The clinical goals for the shift include see POC    Problem: Pain  Goal: My pain/discomfort is manageable  Outcome: Progressing     Problem: Safety  Goal: Patient will be injury free during hospitalization  Outcome: Progressing  Goal: I will remain free of falls  Outcome: Progressing     Problem: Daily Care  Goal: Daily care needs are met  Outcome: Progressing     Problem: Psychosocial Needs  Goal: Demonstrates ability to cope with hospitalization/illness  Outcome: Progressing  Goal: Collaborate with me, my family, and caregiver to identify my specific goals  Outcome: Progressing     Problem: Discharge Barriers  Goal: My discharge needs are met  Outcome: Progressing

## 2024-01-10 NOTE — PROGRESS NOTES
Claude Huff is a 78 y.o. female on day 2 of admission presenting with Mass of left lung.      Subjective     NAEON. Patient denied fever, chills, SOB, lightheadedness, or dizziness.      Objective     Last Recorded Vitals  /63 (BP Location: Left arm, Patient Position: Sitting)   Pulse 62   Temp 36.9 °C (98.4 °F) (Temporal)   Resp 16   Wt 68.9 kg (152 lb)   SpO2 93%   Intake/Output last 3 Shifts:    Intake/Output Summary (Last 24 hours) at 1/10/2024 1457  Last data filed at 1/10/2024 1445  Gross per 24 hour   Intake 901.25 ml   Output --   Net 901.25 ml       Admission Weight  Weight: 68.9 kg (152 lb) (01/08/24 2051)    Daily Weight  01/08/24 : 68.9 kg (152 lb)    Image Results  ECG 12 lead  Sinus bradycardia  Otherwise normal ECG  When compared with ECG of 30-DEC-2021 13:51,  ST no longer elevated in Inferior leads  T wave inversion no longer evident in Inferior leads  Reconfirmed by Patric Flores ([id number]) on 1/9/2024 5:41:52 PM  ECG 12 lead  Sinus bradycardia  T wave abnormality, consider inferior ischemia  When compared with ECG of 08-JAN-2024 14:22, (unconfirmed)  T wave inversion more evident in Anterior leads  Reconfirmed by Patric Flores ([id number]) on 1/9/2024 5:38:17 PM  Transthoracic Echo (TTE) Complete              [facility] [place]  [address] [facility] [address]     Tel [phone] Fax [phone]    TRANSTHORACIC ECHOCARDIOGRAM REPORT       Patient Name:      CLAUDE HUFF       Reading Physician:    [id number] Patric Flores MD  Study Date:        1/9/2024            Ordering Provider:    [id number] CHRISTIE PATEL  MRN/PID:           [id number]            Fellow:  Accession#:        [id number]        Nurse:  Date of Birth/Age: 1945 / 78     Sonographer:          Madyson Sánchez RDCS                     years  Gender:            F                    Additional Staff:  Height:            167.00 cm           Admit Date:  Weight:            68.95 kg            Admission Status:     Observation -                                                               Priority discharge  BSA:               1.78 m2             Department Location:  [facility] Lab  Blood Pressure: 148 /73 mmHg    Study Type:    TRANSTHORACIC ECHO (TTE) COMPLETE  Diagnosis/ICD: Syncope and collapse-R55  Indication:    syncope and collapse   Study Detail: The following Echo studies were performed: 2D, M-Mode, Doppler and                color flow.       PHYSICIAN INTERPRETATION:  Left Ventricle: Left ventricular systolic function is normal, with an estimated ejection fraction of 65-70%. Estimated left ventricular mass is normal. There are no regional wall motion abnormalities. The left ventricular cavity size is normal. The left ventricular septal wall thickness is normal. There is normal left ventricular posterior wall thickness. Spectral Doppler shows a normal pattern of left ventricular diastolic filling. There are normal left ventricular filling pressures.  LV Wall Scoring:  All segments are normal.    Left Atrium: The left atrium is normal in size.  Right Ventricle: The right ventricle is normal in size. There is normal right ventricular global systolic function.  Right Atrium: The right atrium is normal in size.  Aortic Valve: The aortic valve is trileaflet. There is no evidence of aortic valve regurgitation. The peak instantaneous gradient of the aortic valve is 8.0 mmHg.  Mitral Valve: The mitral valve is normal in structure. There is no evidence of mitral valve regurgitation.  Tricuspid Valve: The tricuspid valve is structurally normal. There is trace tricuspid regurgitation. The Doppler estimated RVSP is within normal limits at 26.8 mmHg.  Pulmonic Valve: The pulmonic valve is structurally normal. There is no indication of pulmonic valve regurgitation.  Pericardium: There is no  pericardial effusion noted.  Aorta: The aortic root is normal. There is no dilatation of the aortic arch. There is no dilatation of the ascending aorta. There is no dilatation of the aortic root.  Systemic Veins: The inferior vena cava appears to be of normal size. The hepatic vein appears to be of normal size. There is IVC inspiratory collapse greater than 50%. The hepatic vein shows a normal flow pattern.       CONCLUSIONS:   1. Left ventricular systolic function is normal with a 65-70% estimated ejection fraction.   2. RVSP within normal limits.    QUANTITATIVE DATA SUMMARY:  2D MEASUREMENTS:                           Normal Ranges:  LAs:           3.70 cm   (2.7-4.0cm)  IVSd:          1.10 cm   (0.6-1.1cm)  LVPWd:         1.00 cm   (0.6-1.1cm)  LVIDd:         4.00 cm   (3.9-5.9cm)  LVIDs:         2.80 cm  LV Mass Index: 76.7 g/m2  LV % FS        30.0 %    LA VOLUME:                              Normal Ranges:  LA Area A4C:     17.0 cm2  LA Area A2C:     18.0 cm2  LA Volume Index: 27.0 ml/m2    M-MODE MEASUREMENTS:                   Normal Ranges:  Ao Root: 2.70 cm (2.0-3.7cm)  AoV Exc: 2.20 cm (1.5-2.5cm)    AORTA MEASUREMENTS:                     Normal Ranges:  AoV Exc:   2.20 cm (1.5-2.5cm)  Asc Ao, d: 3.00 cm (2.1-3.4cm)    LV SYSTOLIC FUNCTION BY 2D PLANIMETRY (MOD):                      Normal Ranges:  EF-A4C View: 74.0 % (>=55%)  EF-A2C View: 66.4 %  EF-Biplane:  69.4 %    LV DIASTOLIC FUNCTION:                         Normal Ranges:  MV Peak E:    1.16 m/s (0.7-1.2 m/s)  MV Peak A:    0.80 m/s (0.42-0.7 m/s)  E/A Ratio:    1.45     (1.0-2.2)  MV e'         0.08 m/s (>8.0)  MV lateral e' 0.08 m/s  MV medial e'  0.07 m/s  E/e' Ratio:   15.47    (<8.0)    MITRAL VALVE:                  Normal Ranges:  MV DT: 236 msec (150-240msec)    AORTIC VALVE:                          Normal Ranges:  AoV Vmax:      1.41 m/s (<=1.7m/s)  AoV Peak P.0 mmHg (<20mmHg)  LVOT Max Pelon:  1.30 m/s (<=1.1m/s)  LVOT Diameter:  2.10 cm  (1.8-2.4cm)  AoV Area,Vmax: 3.19 cm2 (2.5-4.5cm2)       RIGHT VENTRICLE:  RV Basal 3.00 cm  RV Mid   2.30 cm  RV Major 5.8 cm  TAPSE:   17.9 mm  RV s'    0.11 m/s    TRICUSPID VALVE/RVSP:                              Normal Ranges:  Peak TR Velocity: 2.44 m/s  RV Syst Pressure: 26.8 mmHg (< 30mmHg)       [id number] Patric Flores MD  Electronically signed on 1/9/2024 at 3:51:11 PM       Wall Scoring       ** Final **  CT abdomen pelvis w IV contrast  Narrative: Interpreted By:  Jaswant Mayorga,   STUDY:  CT ABDOMEN PELVIS W IV CONTRAST;  1/9/2024 2:27 pm      INDICATION:  Signs/Symptoms:Concern for intraabdominal malgnancy..      COMPARISON:  09/17/2018      ACCESSION NUMBER(S):  [id number]      ORDERING CLINICIAN:  TAHMINA CHASE      TECHNIQUE:  CT of the abdomen and pelvis was performed.  Contiguous axial images  were obtained at 3 mm slice thickness through the abdomen and pelvis.  Coronal and sagittal reconstructions at 3 mm slice thickness were  performed. 75 mL Omnipaque 350 ntrast administered intravenously  without immediate complication.      FINDINGS:  LOWER CHEST:  Respiratory motion. Bibasilar atelectasis. Coronary atherosclerosis.  Trace left pleural effusion.      ABDOMEN:      LIVER:  There are numerous new hypoenhancing masses scattered throughout  every segment of the liver measuring up to 29 mm in segment IV B.      BILE DUCTS:  Not dilated.      GALLBLADDER:  Cholelithiasis.      PANCREAS:  Numerous clustered calcifications in the uncinate process suggestive  of chronic pancreatitis. Otherwise unremarkable.      SPLEEN:  Unremarkable      ADRENAL GLANDS:  New bilateral adrenal masses measuring 4.7 cm on the left and 3.8 cm  on the right.      KIDNEYS AND URETERS:  Unremarkable without hydronephrosis.      PELVIS:      BLADDER:  Nearly empty.      REPRODUCTIVE ORGANS:  Uterus is present. Pelvic varices. 3 cm fat containing left ovarian  mass compatible with teratoma.      BOWEL:  Severe distal  colonic diverticulosis. No findings of bowel  obstruction or inflammation. Appendix not identified. Unremarkable  mesentery.      VESSELS:  Aortoiliac system is patent but severely atherosclerotic. Major  visceral branches are patent.Saccular infrarenal aneurysm enlarged  from 32 mm to 35 mm. IVC and major branches are grossly patent.  Major portal venous branches are patent.      PERITONEUM AND RETROPERITONEUM:  No free fluid or free air. 7 mm nodule posterior to the left psoas  muscle just below the pelvic inlet. A 4 mm nodule is noted slightly  more laterally. No abdominal or pelvic lymphadenopathy.      BONE AND ABDOMINAL WALL:  There are numerous new scattered ill-defined sclerotic lesions  involving the pelvis and proximal femora. Mild anterior wedging at  T12 grossly appears chronic. Degenerative changes of the spine.      Impression: 1. Numerous hepatic, bilateral adrenal, and pelvic/proximal femur  osteoblastic metastases. A few subcentimeter left retroperitoneal  nodules may also be metastatic.  2. Abdominal aortic aneurysm is slightly enlarged from 3.2 cm to 3.5  cm compared to 5 years ago.      MACRO:  None.      Signed by: Jaswant Mayorga 1/9/2024 2:52 PM  Dictation workstation:   [id number]  MR brain w and wo IV contrast  Narrative: Interpreted By:  Pito Zepeda,  and Slick Jonas   STUDY:  MR BRAIN W AND WO IV CONTRAST;  1/9/2024 12:49 pm      INDICATION:  Signs/Symptoms:transient syncope, possible metastasis.      COMPARISON:  MRI      ACCESSION NUMBER(S):  [id number]      ORDERING CLINICIAN:  PAMELA LYNN      TECHNIQUE:  Axial T2, FLAIR, DWI, gradient echo T2 and sagittal and coronal T1  weighted images of brain were acquired. Post contrast T1 weighted  images were acquired after administration of 14 mL Dotarem gadolinium  based intravenous contrast.      FINDINGS:  Evaluation somewhat limited due to patient motion artifact.      CSF Spaces: Diffuse sulcal and ventricular prominence, at least  in  part secondary age-related parenchymal volume loss. No extra-axial  fluid collection.      Parenchyma: There is no diffusion restriction abnormality to suggest  acute infarct.  Nonspecific T2/FLAIR hyperintense signal throughout  the subcortical and periventricular white matter, similar to prior  exam, which may be sequelae chronic small-vessel ischemic disease.  Otherwise no abnormal parenchymal signal abnormalities.  Redemonstration of encephalomalacia/gliosis involving right occipital  lobe, similar to prior exam. There is no mass effect or midline  shift. No abnormal areas of enhancement.      Paranasal Sinuses and Mastoids: Visualized paranasal sinuses and  mastoid air cells are unremarkable.      Impression: 1. No evidence of metastatic disease within the brain was identified.  2. Chronic findings as detailed above.      I personally reviewed the images/study and I agree with the findings  as stated. This study was interpreted at [place], [place].      MACRO:  None      Signed by: Pito Zepeda 1/9/2024 1:49 PM  Dictation workstation:   [id number]      Physical Exam  Constitutional:       General: She is not in acute distress.     Appearance: Normal appearance.   HENT:      Head: Normocephalic and atraumatic.      Comments: Left occipital parietal hematoma, improved  Cardiovascular:      Rate and Rhythm: Normal rate and regular rhythm.      Pulses: Normal pulses.      Heart sounds: Normal heart sounds.   Pulmonary:      Effort: Pulmonary effort is normal. No respiratory distress.      Breath sounds: Normal breath sounds. No wheezing, rhonchi or rales.   Abdominal:      General: Abdomen is flat. Bowel sounds are normal. There is no distension.      Palpations: Abdomen is soft.      Tenderness: There is no abdominal tenderness.   Skin:     General: Skin is warm and dry.   Neurological:      General: No focal deficit present.      Mental Status: She is alert and  oriented to person, place, and time.         Relevant Results    Scheduled medications  [Held by provider] aspirin, 81 mg, oral, Daily  atorvastatin, 80 mg, oral, Daily  cholecalciferol, 1,000 Units, oral, Daily  enoxaparin, 40 mg, subcutaneous, q24h  escitalopram, 20 mg, oral, Daily  famotidine, 20 mg, oral, Daily  levothyroxine, 75 mcg, oral, Daily  lisinopril, 2.5 mg, oral, Daily  perflutren lipid microspheres, 0.5-10 mL of dilution, intravenous, Once in imaging  perflutren protein A microsphere, 0.5 mL, intravenous, Once in imaging  polyethylene glycol, 17 g, oral, Daily  sulfur hexafluoride microsphr, 2 mL, intravenous, Once in imaging      Continuous medications     PRN medications  PRN medications: nitroglycerin    Results for orders placed or performed during the hospital encounter of 01/08/24 (from the past 24 hour(s))   CBC and Auto Differential   Result Value Ref Range    WBC 6.8 4.4 - 11.3 x10*3/uL    nRBC 0.0 0.0 - 0.0 /100 WBCs    RBC 4.25 4.00 - 5.20 x10*6/uL    Hemoglobin 12.2 12.0 - 16.0 g/dL    Hematocrit 40.6 36.0 - 46.0 %    MCV 96 80 - 100 fL    MCH 28.7 26.0 - 34.0 pg    MCHC 30.0 (L) 32.0 - 36.0 g/dL    RDW 14.1 11.5 - 14.5 %    Platelets 193 150 - 450 x10*3/uL    Neutrophils % 64.0 40.0 - 80.0 %    Immature Granulocytes %, Automated 0.4 0.0 - 0.9 %    Lymphocytes % 22.8 13.0 - 44.0 %    Monocytes % 9.4 2.0 - 10.0 %    Eosinophils % 2.7 0.0 - 6.0 %    Basophils % 0.7 0.0 - 2.0 %    Neutrophils Absolute 4.34 1.60 - 5.50 x10*3/uL    Immature Granulocytes Absolute, Automated 0.03 0.00 - 0.50 x10*3/uL    Lymphocytes Absolute 1.55 0.80 - 3.00 x10*3/uL    Monocytes Absolute 0.64 0.05 - 0.80 x10*3/uL    Eosinophils Absolute 0.18 0.00 - 0.40 x10*3/uL    Basophils Absolute 0.05 0.00 - 0.10 x10*3/uL   Renal Function Panel   Result Value Ref Range    Glucose 89 74 - 99 mg/dL    Sodium 140 136 - 145 mmol/L    Potassium 3.5 3.5 - 5.3 mmol/L    Chloride 107 98 - 107 mmol/L    Bicarbonate 25 21 - 32 mmol/L     Anion Gap 12 10 - 20 mmol/L    Urea Nitrogen 11 6 - 23 mg/dL    Creatinine 0.78 0.50 - 1.05 mg/dL    eGFR 79 >60 mL/min/1.73m*2    Calcium 8.4 (L) 8.6 - 10.3 mg/dL    Phosphorus 3.8 2.5 - 4.9 mg/dL    Albumin 3.5 3.4 - 5.0 g/dL   Magnesium   Result Value Ref Range    Magnesium 2.07 1.60 - 2.40 mg/dL   Cortisol AM   Result Value Ref Range    Cortisol  A.M. 17.4 5.0 - 20.0 ug/dL       Assessment/Plan     Claude is a 78 year old female with PMH of H. Flu meningitis, CAD s/p percutaneous coronary angioplasty, HDL, hypothyroidism, and anxiety presenting with chief complaint of syncope and collapse with head strike witnessed by grandson at home. Patient is hemodynamically stable. PE unchanged.      Principal Problem:    Mass of left lung  Active Problems:    Benign essential hypertension    CAD S/P percutaneous coronary angioplasty    Gastroesophageal reflux disease    Generalized anxiety disorder    #Syncope  #Bradycardia  #CAD s/p STEMI PCI/SAUMYA to RCA in 12/2021  -CBC and RFP unremarkable. Mg normal.  -AM Cortisol 17.4  -TTE showed normal LV function, 65-70% EF, and RVSP within normal limits  -On telemetry  -Continue patient's home medications as appropriate    #Left upper lobe mass with apparent widely metastatic disease  #Bilateral adrenal masses  -Patient is asymptomatic. No respiratory distress on exam, with no supplemental oxygen requirement   -CT abdomen/pelvis showed numerous hepatic, bilateral adrenal, and pelvic/proximal femur osteoblastic metastases. A few subcentimeter left retroperitoneal nodules may also be metastatic. Abdominal aortic aneurysm is slightly enlarged from 3.2 cm to 3.5 cm compared to 5 years ago.  -IR deferred adrenal biopsy at this time. Aspirin held for at least 5 days for procedure  -Bronchoscopy (1/10) showed large fungating mass in L lobe obstructing airway. Biopsies x3 taken with minimal bleeding   -D-Dimer was elevated and a CTA was performed of the chest showing a dense left upper  lobe consolidation, also noted to have adrenal masses, liver masses and lymphadenopathy, likely representing metastatic malignancy    #HTN  #HLD  -Continue patient's home medications as appropriate  -ASA 81mg held for potential adrenal gland biopsy via IR  -ASA needs to be held for 5 days, per IR     #Hypothyroidism  -TSH 0.88  -We will continue patient's home Synthroid 75 mcg daily     #GERD  #General anxiety  -Will continue home meds as appropriate      Diet: Resume regular diet. Patient is s/p bronchoscopy  Telemetry  Maintenance fluids: LR  Consults: IR and pulmonology    Dispo: anticipate 1-2 days for hospital stay     CODE STATUS: DNR CCA DNI    Filippo Gordon, MS3      Senior Resident addendum    Patient is a 78-year-old female with a past history of haemophilus influenza meningitis (2019) CAD status post percutaneous coronary angioplasty with SAUMYA to RCA (2021) hyperlipidemia, hypothyroidism, anxiety who presented to the hospital with complaints of transient syncope lasting for 45 seconds without any prodrome and followed by 1 minute of confusion.  Event witnessed by grandson.  For her transient syncope, differentials included vasovagal versus cardiogenic.  Orthostats were negative.  Patient does have a baseline bradycardia-sinus rhythm.  An echo was performed that was unremarkable.  An MRI of the head was done to look for any metastasis in the setting of recent CT scan findings.  Patient denies any chest pain, palpitation, nausea, vomiting, diaphoresis.  Possible explanations for her presentation include vasovagal syncope due to vagal compression from lung mass -patient did receive fluids during the admission and was continued on telemetry.  She continues to be asymptomatic without any further episodes of syncope.    Patient did have similar findings of left upper lobe lung mass with widely metastasic lesions-bilateral adrenals, retroperitoneal, femur.  These findings are new and patient received a  bronchoscopy with biopsy x 3 with pulmonology team 1/10/2024.  Her initial plan was an adrenal biopsy however IR recommended holding aspirin for at least 5 days, subsequently scheduling for an outpatient IR biopsy 1/23.  We have notified Dr. Garcia, oncology about this patient as well as outpatient referral for him.  Will also be coordinating with Dr. Garcia regarding any further need for adrenal biopsy especially when a bronchoscopy and biopsy was done today.  Possible discharge tomorrow and outpatient follow-up with and follow-up on biopsy.    Patient seen and examined independently from intern physician.  The note as shown as above has been edited to reflect my input.    Marie Toro MD  PGY-3

## 2024-01-10 NOTE — PROGRESS NOTES
"Claude Monaco is a 78 y.o. female on day 2 of admission presenting with Mass of left lung.    Subjective   Seen in Endoscopy suit. No complaints, breathing is stable. Consent taking for Bronchoscopy. Risks explained and reviewed.        Objective     Physical Exam    Constitutional: A&Ox4, elderly, NAD, resting comfortable   Head and Face: Atraumatic, normocephalic   Eyes: Normal external exam, EOMI  ENT: Normal external inspection of ears and nose. Oropharynx normal.  Cardiovascular: RRR, S1/S2, no murmurs, rubs, or gallops, radial pulses +2  Pulmonary: CTAB, no respiratory distress, no wheezing, rales or rhonchi, on RA  Abdomen: +BS, soft, non-tender, nondistended, no guarding rigidity or rebound tenderness, no masses noted  MSK: Negative for edema, No joint swelling, normal movements of all extremities.   Neuro: No focal deficits, normal motor function, normal sensation, follows all commands  Skin- No lesions, contusions, or erythema.  Psychiatric: Judgment intact. Appropriate mood, affect and behavior      Last Recorded Vitals  Blood pressure (!) 93/49, pulse (!) 49, temperature 36 °C (96.8 °F), temperature source Temporal, resp. rate (!) 35, height 1.676 m (5' 6\"), weight 68.9 kg (152 lb), SpO2 93 %.  Intake/Output last 3 Shifts:  I/O last 3 completed shifts:  In: 765 (11.1 mL/kg) [I.V.:765 (11.1 mL/kg)]  Out: - (0 mL/kg)   Weight: 68.9 kg     Relevant Results  Scheduled medications  [Held by provider] aspirin, 81 mg, oral, Daily  atorvastatin, 80 mg, oral, Daily  cholecalciferol, 1,000 Units, oral, Daily  enoxaparin, 40 mg, subcutaneous, q24h  escitalopram, 20 mg, oral, Daily  famotidine, 20 mg, oral, Daily  levothyroxine, 75 mcg, oral, Daily  lisinopril, 2.5 mg, oral, Daily  perflutren lipid microspheres, 0.5-10 mL of dilution, intravenous, Once in imaging  perflutren protein A microsphere, 0.5 mL, intravenous, Once in imaging  polyethylene glycol, 17 g, oral, Daily  sulfur hexafluoride microsphr, 2 mL, " intravenous, Once in imaging      Continuous medications  lactated Ringer's, 100 mL/hr      PRN medications  PRN medications: albuterol, fentaNYL PF, HYDROmorphone, labetaloL, meperidine, nitroglycerin, ondansetron, oxyCODONE, promethazine (Phenergan) 6.25 mg in sodium chloride 0.9% 50 mL IV  Results from last 7 days   Lab Units 01/10/24  0600 01/09/24  0714 01/08/24  1431   WBC AUTO x10*3/uL 6.8 7.5 7.7   RBC AUTO x10*6/uL 4.25 4.09 4.41   HEMOGLOBIN g/dL 12.2 11.7* 12.4     Results from last 7 days   Lab Units 01/10/24  0600 01/09/24  0714 01/08/24  1431   SODIUM mmol/L 140 143 143   POTASSIUM mmol/L 3.5 3.7 3.5   CHLORIDE mmol/L 107 110* 106   CO2 mmol/L 25 28 31   BUN mg/dL 11 11 11   CREATININE mg/dL 0.78 0.74 0.86   CALCIUM mg/dL 8.4* 8.2* 8.7   PHOSPHORUS mg/dL 3.8 3.4  --    MAGNESIUM mg/dL 2.07 2.05 2.09   BILIRUBIN TOTAL mg/dL  --   --  0.5   ALT U/L  --   --  8   AST U/L  --   --  14       ECG 12 lead    Result Date: 1/9/2024  Sinus bradycardia T wave abnormality, consider inferior ischemia When compared with ECG of 08-JAN-2024 14:22, (unconfirmed) T wave inversion more evident in Anterior leads Reconfirmed by Patric Flores ([id number]) on 1/9/2024 5:38:17 PM    Transthoracic Echo (TTE) Complete    Result Date: 1/9/2024            [facility] [place] [address] [id number]    Tel [phone] Fax [phone] TRANSTHORACIC ECHOCARDIOGRAM REPORT  Patient Name:      OPAL Arnold Physician:    [id number] Patric Flores MD Study Date:        1/9/2024            Ordering Provider:    [id number] CHRISTIE PATEL MRN/PID:           [id number]            Fellow: Accession#:        [id number]        Nurse: Date of Birth/Age: 1945 / 78     Sonographer:          Madyson Sánchez RDCS                    years Gender:            F                   Additional Staff:  Height:            167.00 cm           Admit Date: Weight:            68.95 kg            Admission Status:     Observation -                                                              Priority discharge BSA:               1.78 m2             Department Location:  [facility] Echo Lab Blood Pressure: 148 /73 mmHg Study Type:    TRANSTHORACIC ECHO (TTE) COMPLETE Diagnosis/ICD: Syncope and collapse-R55 Indication:    syncope and collapse  Study Detail: The following Echo studies were performed: 2D, M-Mode, Doppler and               color flow.  PHYSICIAN INTERPRETATION: Left Ventricle: Left ventricular systolic function is normal, with an estimated ejection fraction of 65-70%. Estimated left ventricular mass is normal. There are no regional wall motion abnormalities. The left ventricular cavity size is normal. The left ventricular septal wall thickness is normal. There is normal left ventricular posterior wall thickness. Spectral Doppler shows a normal pattern of left ventricular diastolic filling. There are normal left ventricular filling pressures. LV Wall Scoring: All segments are normal. Left Atrium: The left atrium is normal in size. Right Ventricle: The right ventricle is normal in size. There is normal right ventricular global systolic function. Right Atrium: The right atrium is normal in size. Aortic Valve: The aortic valve is trileaflet. There is no evidence of aortic valve regurgitation. The peak instantaneous gradient of the aortic valve is 8.0 mmHg. Mitral Valve: The mitral valve is normal in structure. There is no evidence of mitral valve regurgitation. Tricuspid Valve: The tricuspid valve is structurally normal. There is trace tricuspid regurgitation. The Doppler estimated RVSP is within normal limits at 26.8 mmHg. Pulmonic Valve: The pulmonic valve is structurally normal. There is no indication of pulmonic valve regurgitation. Pericardium: There is no pericardial effusion noted. Aorta: The aortic root is  normal. There is no dilatation of the aortic arch. There is no dilatation of the ascending aorta. There is no dilatation of the aortic root. Systemic Veins: The inferior vena cava appears to be of normal size. The hepatic vein appears to be of normal size. There is IVC inspiratory collapse greater than 50%. The hepatic vein shows a normal flow pattern.  CONCLUSIONS:  1. Left ventricular systolic function is normal with a 65-70% estimated ejection fraction.  2. RVSP within normal limits. QUANTITATIVE DATA SUMMARY: 2D MEASUREMENTS:                          Normal Ranges: LAs:           3.70 cm   (2.7-4.0cm) IVSd:          1.10 cm   (0.6-1.1cm) LVPWd:         1.00 cm   (0.6-1.1cm) LVIDd:         4.00 cm   (3.9-5.9cm) LVIDs:         2.80 cm LV Mass Index: 76.7 g/m2 LV % FS        30.0 % LA VOLUME:                             Normal Ranges: LA Area A4C:     17.0 cm2 LA Area A2C:     18.0 cm2 LA Volume Index: 27.0 ml/m2 M-MODE MEASUREMENTS:                  Normal Ranges: Ao Root: 2.70 cm (2.0-3.7cm) AoV Exc: 2.20 cm (1.5-2.5cm) AORTA MEASUREMENTS:                    Normal Ranges: AoV Exc:   2.20 cm (1.5-2.5cm) Asc Ao, d: 3.00 cm (2.1-3.4cm) LV SYSTOLIC FUNCTION BY 2D PLANIMETRY (MOD):                     Normal Ranges: EF-A4C View: 74.0 % (>=55%) EF-A2C View: 66.4 % EF-Biplane:  69.4 % LV DIASTOLIC FUNCTION:                        Normal Ranges: MV Peak E:    1.16 m/s (0.7-1.2 m/s) MV Peak A:    0.80 m/s (0.42-0.7 m/s) E/A Ratio:    1.45     (1.0-2.2) MV e'         0.08 m/s (>8.0) MV lateral e' 0.08 m/s MV medial e'  0.07 m/s E/e' Ratio:   15.47    (<8.0) MITRAL VALVE:                 Normal Ranges: MV DT: 236 msec (150-240msec) AORTIC VALVE:                         Normal Ranges: AoV Vmax:      1.41 m/s (<=1.7m/s) AoV Peak P.0 mmHg (<20mmHg) LVOT Max Pelon:  1.30 m/s (<=1.1m/s) LVOT Diameter: 2.10 cm  (1.8-2.4cm) AoV Area,Vmax: 3.19 cm2 (2.5-4.5cm2)  RIGHT VENTRICLE: RV Basal 3.00 cm RV Mid   2.30 cm RV Major 5.8  cm TAPSE:   17.9 mm RV s'    0.11 m/s TRICUSPID VALVE/RVSP:                             Normal Ranges: Peak TR Velocity: 2.44 m/s RV Syst Pressure: 26.8 mmHg (< 30mmHg)  [id number] Patric Flores MD Electronically signed on 1/9/2024 at 3:51:11 PM  Wall Scoring  ** Final **     CT abdomen pelvis w IV contrast    Result Date: 1/9/2024  Interpreted By:  Jaswant Mayorga, STUDY: CT ABDOMEN PELVIS W IV CONTRAST;  1/9/2024 2:27 pm   INDICATION: Signs/Symptoms:Concern for intraabdominal malgnancy..   COMPARISON: 09/17/2018   ACCESSION NUMBER(S): [id number]   ORDERING CLINICIAN: TAHMINA CHASE   TECHNIQUE: CT of the abdomen and pelvis was performed.  Contiguous axial images were obtained at 3 mm slice thickness through the abdomen and pelvis. Coronal and sagittal reconstructions at 3 mm slice thickness were performed. 75 mL Omnipaque 350 ntrast administered intravenously without immediate complication.   FINDINGS: LOWER CHEST: Respiratory motion. Bibasilar atelectasis. Coronary atherosclerosis. Trace left pleural effusion.   ABDOMEN:   LIVER: There are numerous new hypoenhancing masses scattered throughout every segment of the liver measuring up to 29 mm in segment IV B.   BILE DUCTS: Not dilated.   GALLBLADDER: Cholelithiasis.   PANCREAS: Numerous clustered calcifications in the uncinate process suggestive of chronic pancreatitis. Otherwise unremarkable.   SPLEEN: Unremarkable   ADRENAL GLANDS: New bilateral adrenal masses measuring 4.7 cm on the left and 3.8 cm on the right.   KIDNEYS AND URETERS: Unremarkable without hydronephrosis.   PELVIS:   BLADDER: Nearly empty.   REPRODUCTIVE ORGANS: Uterus is present. Pelvic varices. 3 cm fat containing left ovarian mass compatible with teratoma.   BOWEL: Severe distal colonic diverticulosis. No findings of bowel obstruction or inflammation. Appendix not identified. Unremarkable mesentery.   VESSELS: Aortoiliac system is patent but severely atherosclerotic. Major visceral branches are  patent.Saccular infrarenal aneurysm enlarged from 32 mm to 35 mm. IVC and major branches are grossly patent. Major portal venous branches are patent.   PERITONEUM AND RETROPERITONEUM: No free fluid or free air. 7 mm nodule posterior to the left psoas muscle just below the pelvic inlet. A 4 mm nodule is noted slightly more laterally. No abdominal or pelvic lymphadenopathy.   BONE AND ABDOMINAL WALL: There are numerous new scattered ill-defined sclerotic lesions involving the pelvis and proximal femora. Mild anterior wedging at T12 grossly appears chronic. Degenerative changes of the spine.       1. Numerous hepatic, bilateral adrenal, and pelvic/proximal femur osteoblastic metastases. A few subcentimeter left retroperitoneal nodules may also be metastatic. 2. Abdominal aortic aneurysm is slightly enlarged from 3.2 cm to 3.5 cm compared to 5 years ago.   MACRO: None.   Signed by: Jaswant Mayorga 1/9/2024 2:52 PM Dictation workstation:   [id number]    MR brain w and wo IV contrast    Result Date: 1/9/2024  Interpreted By:  Pito Zepeda,  and Slick Jonas STUDY: MR BRAIN W AND WO IV CONTRAST;  1/9/2024 12:49 pm   INDICATION: Signs/Symptoms:transient syncope, possible metastasis.   COMPARISON: MRI   ACCESSION NUMBER(S): [id number]   ORDERING CLINICIAN: PAMELA LYNN   TECHNIQUE: Axial T2, FLAIR, DWI, gradient echo T2 and sagittal and coronal T1 weighted images of brain were acquired. Post contrast T1 weighted images were acquired after administration of 14 mL Dotarem gadolinium based intravenous contrast.   FINDINGS: Evaluation somewhat limited due to patient motion artifact.   CSF Spaces: Diffuse sulcal and ventricular prominence, at least in part secondary age-related parenchymal volume loss. No extra-axial fluid collection.   Parenchyma: There is no diffusion restriction abnormality to suggest acute infarct.  Nonspecific T2/FLAIR hyperintense signal throughout the subcortical and periventricular white matter, similar  to prior exam, which may be sequelae chronic small-vessel ischemic disease. Otherwise no abnormal parenchymal signal abnormalities. Redemonstration of encephalomalacia/gliosis involving right occipital lobe, similar to prior exam. There is no mass effect or midline shift. No abnormal areas of enhancement.   Paranasal Sinuses and Mastoids: Visualized paranasal sinuses and mastoid air cells are unremarkable.       1. No evidence of metastatic disease within the brain was identified. 2. Chronic findings as detailed above.   I personally reviewed the images/study and I agree with the findings as stated. This study was interpreted at [place], [place].   MACRO: None   Signed by: Pito Zepeda 1/9/2024 1:49 PM Dictation workstation:   [id number]    CT angio chest for pulmonary embolism    Result Date: 1/8/2024  Interpreted By:  Ruben Whitten, STUDY: CT ANGIO CHEST FOR PULMONARY EMBOLISM;  1/8/2024 5:50 pm   INDICATION: Signs/Symptoms:syncope, elevated dimer, also further elucidation of lung mass seen on c spine CT.   COMPARISON: None.   ACCESSION NUMBER(S): [id number]   ORDERING CLINICIAN: JOE ESQUIVEL   TECHNIQUE: Contiguous axial images of the chest were obtained after the intravenous administration of 60 mL Omnipaque 350 contrast using angiographic PE protocol. Coronal and sagittal reformatted images were reconstructed from the axial data. MIP images were created on an independent workstation and reviewed.   FINDINGS: PULMONARY ARTERIES: Adequate opacification to the level of the mid segmental arteries. The distal segmental and subsegmental arteries, particularly in the left upper lobe limited by mixing artifact and volume loss/consolidation. No filling defect to suggest pulmonary embolus in the visualized pulmonary arteries. The main pulmonary artery is normal in diameter. There is reflux of contrast into the hepatic venous circuit suggesting impaired right heart function.    HEART: Normal in size. Mild to moderate triple-vessel coronary vascular calcifications. No significant pericardial effusion.   VESSELS: Normal caliber aorta without dissection. Moderate atherosclerotic calcifications of the aortic arch and descending thoracic aorta.   MEDIASTINUM AND LYMPH NODES: Enlarged prevascular lymph nodes measuring up to 2.2 x 1.6 cm. The visualized thyroid is within normal limits. No pneumomediastinum. The esophagus appears within normal limits.   LUNG, AIRWAYS, AND PLEURA: The trachea and mainstem bronchi are patent. The left upper lobe bronchi are occluded proximally. Dense consolidation involving the majority of the left upper lobe with associated volume loss. There is a relatively low-density region centrally within the consolidation which appears to exert mass effect upon the adjacent bronchovascular structures, raising concern for underlying mass. Paraseptal and centrilobular emphysematous changes in the lung apices. No pleural effusion or pneumothorax.   OSSEOUS STRUCTURES: No acute osseous abnormality.   CHEST WALL SOFT TISSUES: 1.2 cm nodule in the superficial subcutaneous soft tissues of the midline upper chest, possibly a sebaceous cyst. 0.8 cm nodule in the subcutaneous fat of the medial aspect of the left breast. Recommend mammographic correlation.   UPPER ABDOMEN/OTHER: Probable enlarged lymph node in the gastrohepatic ligament, suboptimally visualized. Diffuse thickening/enlargement of the bilateral adrenal glands concerning for adrenal metastases.       1. No evidence of pulmonary embolus to the level of the mid segmental arteries. The distal segmental arteries are suboptimally assessed as detailed above. 2. Dense left upper lobe consolidation with occlusion of the left upper lobe bronchi proximally. Areas of relatively low-density which appear to displace the adjacent bronchovascular structures within the central part of the consolidation raising concern for underlying  mass. There is likely superimposed volume loss and postobstructive pneumonia. 3. Enlarged prevascular lymph nodes measuring up to 2.2 cm. Prominent right paratracheal nodes measuring up to 1 cm. 4. Partially visualized bilateral adrenal masses measuring at least 4.4 cm on the left and 2.8 cm in the right concerning for adrenal metastases. Recommend dedicated CT of the abdomen and pelvis. 5. A few scattered nodularities within the left breast, recommend mammographic correlation.   MACRO: Critical Finding:  See findings. Notification was initiated on 1/8/2024 at 6:21 pm by  Ruben Whitten.  (**-YCF-**) Instructions:   Signed by: Ruben Whitten 1/8/2024 6:22 PM Dictation workstation:   [id number]       Assessment/Plan   Principal Problem:    Mass of left lung  Active Problems:    Benign essential hypertension    CAD S/P percutaneous coronary angioplasty    Gastroesophageal reflux disease    Generalized anxiety disorder    Patient is a 78 y.o. 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 Patient is asymptomatic from a respiratory prospective without wheeze, cough, or dyspnea.     #Left Lung mass  #Left upper lobe lung collapse   #B/l adrenal masses concerning for metastasis  #Current Smoker  -Recommend biopsy of Adrenal gland via IR   -Bronchoscopy (1/10) showed large fungating mass in L lobe obstructing airway. Biopsies x3 taken with minimal bleeding.  -Follow up pathology  -Monitor for worsening hemoptysis or respiratory decline  -Smoking cessation     Please wait for signature of attending for final recommendations.  Pritesh Leigh D.O.  Internal Medicine, PGY-1

## 2024-01-10 NOTE — NURSING NOTE
EOS: Patient remained A&O x4 throughout this shift noting no complaints from patient. Patient rested quietly this shift and repositioned independently as needed throughout the night. Patient was assisted to the bathroom with x1 assist with no acute events occurring. Patient has been NPO since 0000 for scheduled procedure today.  Patient remains on tele, NSR-SB, with cont. Pulse ox worn. Patient safety maintained with bed alarm on and audible with call bell/possessions within reach.

## 2024-01-11 NOTE — NURSING NOTE
0140- notified teaching of patient reading low 40s on tele. Vitals stable and pt is asymptomatic. No new interventions ordered.   EOS: Patient remained A&O x4 throughout this shift noting no complaints from patient. Patient was assisted to bathroom with x1 assist. Patient remains on tele, Sinus kayce, with cont. Pulse ox attached. Patient wore 2Lo2 throughout the night maintaining adequate oxygenation. Patient safety maintained with bed alarm on and audible with call bell/possessions within reach.

## 2024-01-11 NOTE — CONSULTS
CARDIOLOGY/ELECTROPHYSIOLOGY CONSULTATION    PATIENT NAME: Claude Monaco  PATIENT MRN: [id number]  SERVICE DATE:  1/11/2024  SERVICE TIME: 12:05 PM    CONSULTANT: Jeremiah Barksdale MD  PRIMARY CARE PHYSICIAN: Max Garza MD  ATTENDING PHYSICIAN: Danyel Velázquez DO      IMPRESSIONS:  1.  Pulmonary disease, likely including chronic obstructive pulmonary disease and now with high suspicion of left upper lobe malignancy.  She will undergo further staging and grading on an outpatient basis.  2.  Coronary artery disease, status post inferior STEMI in December 2021 with right coronary artery stenting.  She has preserved left ventricular systolic function and no ischemic symptoms.  3.  Sinus bradycardia, asymptomatic in hospital.  This may be due to at least mild intrinsic sinus node dysfunction.  4.  Syncope at home on 1/8/2024, of uncertain etiology.  This may have been due to orthostasis or perhaps to severe bradycardia.  There is not yet any indication for permanent pacing.  5.  Other medical problems, including hypothyroidism, hyperlipidemia, chronic anxiety, gastroesophageal reflux disease, and remote melanoma.    RECOMMENDATIONS:  1.  The patient's rhythm will be followed on telemetry while she remains in hospital.  2.  I recommend discharge home with a 2-week cardiac event monitor to document her rhythm with any additional symptomatic spells, and also to establish chronotropic competence.  3.  Even if a pacemaker indication emerges, this may not be advisable if the patient has stage IV malignancy.  She will undergo additional outpatient evaluation for that possibility.    Thank you for this consultation.      SIGNATURE: Jeremiah Barksdale MD   OFFICE: [phone]    ================================================================    REASON FOR CONSULTATION: Syncope, bradycardia    HISTORY: Claude Monaco is a 78 y.o. white female, followed primarily by Dr. Rosalio Lee, who presented on 1/8/2024 because of  an apparent syncopal episode at home.  She states that she lives with her grandson and his fiancée.  She and her grandson were standing in the kitchen the patient suddenly became lightheaded and fell to the ground.  Her grandson awakened her.  The patient suffered a contusion to the back of her head but did not have any lacerations.  She was not aware of palpitations, chest pain, dyspnea, or other symptoms with her event.  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  In hospital, she has frequently had sinus bradycardia in the 40s and 50s, though with preserved blood pressure.  Electrophysiology consultation was requested because of the bradycardia and syncope.    PAST MEDICAL HISTORY: The patient has a history of coronary artery disease with an acute inferior ST elevation myocardial infarction in December 2021, with right coronary artery stenting by Dr. Lambert.  She is known to have preserved left ventricular systolic function.  She has had hypothyroidism, chronic anxiety, gastroesophageal reflux disease, a prior melanoma, and some chronic bradycardia with rates in the 40s and 50s at the time of her infarction in 2021.  According to her chart, she also has hypertension and hyperlipidemia, though she denies these.    PAST SURGICAL HISTORY: Remarkable for prior appendectomy, cataract surgeries, tubal ligation, ovarian surgery, right foot fracture repair, and resection of a benign submandibular mass on the left side of her face.    MEDICATIONS:  Current Outpatient Medications on File Prior to Encounter   Medication Sig    nitroglycerin (Nitrostat) 0.4 mg SL tablet Place 1 tablet (0.4 mg) under the tongue every 5 minutes if needed.    aspirin 81 mg EC tablet Take 1 tablet (81 mg) by mouth once daily.    atorvastatin (Lipitor) 80 mg tablet Take 1 tablet (80 mg) by mouth once daily.    cholecalciferol  (Vitamin D-3) 25 MCG (1000 UT) capsule Take 1 capsule (25 mcg) by mouth once daily.    escitalopram (Lexapro) 20 mg tablet Take 1 tablet (20 mg) by mouth once daily.    famotidine (Pepcid) 20 mg tablet Take 1 tablet (20 mg) by mouth once daily.    levothyroxine (Synthroid, Levoxyl) 50 mcg tablet Take 1.5 tablets (75 mcg) by mouth once daily.    lisinopril 2.5 mg tablet Take 1 tablet (2.5 mg) by mouth once daily.    vitamins A,C,E-zinc-copper (ICaps AREDS) 4,296 mcg-226 mg-90 mg capsule Take 2 capsules by mouth 2 times a day.     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     Ceftizoxime Rash     FAMILY HISTORY: Not contributory    SOCIAL HISTORY: The patient is  and has 2 children and 3 grandchildren.  Her 25-year-old grandson and his fiancée live with her.  She did a wide variety of jobs in the past, including working for [employer] and also the [employer].  She was a 1 pack/day smoker for nearly 60 years, and recently has cut back to 1/2 pack of cigarettes daily.  She does not drink alcohol.    COMPLETE REVIEW OF SYSTEMS:  GENERAL: Negative for weight gain or loss  HEENT: Negative for vision or hearing problems  RESPIRATORY: Positive for some chronic exertional dyspnea but negative for productive cough  CARDIAC: Negative for exertional chest  "discomfort or palpitations  VASCULAR: Negative for claudication or peripheral edema  GI: Negative for nausea, vomiting, hematemesis, melena, or hematochezia  MUSCULOSKELETAL: Negative for arthralgia  ENDOCRINE: Negative for heat or cold intolerance, polyuria or polydipsia  HEMATOLOGIC: Negative for bleeding problems  CUTANEOUS: Negative for rashes  NEURO: Negative for seizures; negative for focal neurologic symptoms; negative for prior syncopal episodes before 1/8/2024    PHYSICAL EXAM:  /58 (BP Location: Left arm, Patient Position: Sitting)   Pulse 51   Temp 36.5 °C (97.7 °F) (Temporal)   Resp 18   Ht 1.676 m (5' 6\")   Wt 68.9 kg (152 lb)   SpO2 95%   BMI 24.53 kg/m²   Gen: Pleasant white female in no distress; alert and oriented x 3; anxious to go home  HEENT: Unremarkable; intact vision and hearing  Neck: No jugular venous distention noted  Cardiac: Regular rhythm at slow rate, with no murmurs or gallops noted  Resp: Diminished breath sounds throughout, but no wheezes or rales appreciated  Abd: Non-distended, with no tenderness, no masses, and no organomegaly noted  Ext: Peripheral pulses intact; no peripheral edema  Neuro: Normal gross motor and sensory function; no focal deficits noted  Skin: No lesions noted    EKG's: Sinus rhythm in the 50s with occasional minor nonspecific ST abnormalities    TELEMETRY: Sinus bradycardia into the 40s, but with preserved blood pressure    ECHOCARDIOGRAM (1/9/2024): LVEF 65 to 70%, with no significant valvular abnormalities    CHEST X-RAY: Left upper lobe haziness consistent with malignancy    LABS:  Lab Results   Component Value Date    GLUCOSE 117 (H) 01/11/2024    CALCIUM 8.6 01/11/2024     01/11/2024    K 3.8 01/11/2024    CO2 28 01/11/2024     01/11/2024    BUN 10 01/11/2024    CREATININE 0.79 01/11/2024      Lab Results   Component Value Date    WBC 6.6 01/11/2024    HGB 11.2 (L) 01/11/2024    HCT 36.7 01/11/2024    MCV 92 01/11/2024     " 01/11/2024

## 2024-01-11 NOTE — PROGRESS NOTES
"Claude Monaco is a 78 y.o. female on day 3 of admission presenting with Mass of left lung.    Subjective   Pt with no complaints. Seen and examined at bedside this morning and afternoon.       Objective     Physical Exam    Constitutional: A&Ox4, elderly, NAD, resting comfortable   Head and Face: Atraumatic, normocephalic   Eyes: Normal external exam, EOMI  ENT: Normal external inspection of ears and nose. Oropharynx normal.  Cardiovascular: RRR, S1/S2, no murmurs, rubs, or gallops, radial pulses +2  Pulmonary: CTAB, no respiratory distress, no wheezing, rales or rhonchi, on RA  Abdomen: +BS, soft, non-tender, nondistended, no guarding rigidity or rebound tenderness, no masses noted  MSK: Negative for edema, No joint swelling, normal movements of all extremities.   Neuro: No focal deficits, normal motor function, normal sensation, follows all commands  Skin- No lesions, contusions, or erythema.  Psychiatric: Judgment intact. Appropriate mood, affect and behavior      Last Recorded Vitals  Blood pressure 105/59, pulse 50, temperature 36.3 °C (97.3 °F), resp. rate 18, height 1.676 m (5' 6\"), weight 68.9 kg (152 lb), SpO2 96 %.  Intake/Output last 3 Shifts:  I/O last 3 completed shifts:  In: 1220 (17.7 mL/kg) [P.O.:320; I.V.:900 (13.1 mL/kg)]  Out: - (0 mL/kg)   Weight: 68.9 kg     Relevant Results  Scheduled medications  [Held by provider] aspirin, 81 mg, oral, Daily  atorvastatin, 80 mg, oral, Daily  cholecalciferol, 1,000 Units, oral, Daily  [Held by provider] enoxaparin, 40 mg, subcutaneous, q24h  escitalopram, 20 mg, oral, Daily  famotidine, 20 mg, oral, Daily  levothyroxine, 75 mcg, oral, Daily  lisinopril, 2.5 mg, oral, Daily  perflutren lipid microspheres, 0.5-10 mL of dilution, intravenous, Once in imaging  perflutren protein A microsphere, 0.5 mL, intravenous, Once in imaging  polyethylene glycol, 17 g, oral, Daily  sulfur hexafluoride microsphr, 2 mL, intravenous, Once in imaging      Continuous " medications       PRN medications  PRN medications: nitroglycerin, oxygen  Results from last 7 days   Lab Units 01/11/24  0631 01/10/24  0600 01/09/24  0714   WBC AUTO x10*3/uL 6.6 6.8 7.5   RBC AUTO x10*6/uL 4.00 4.25 4.09   HEMOGLOBIN g/dL 11.2* 12.2 11.7*       Results from last 7 days   Lab Units 01/11/24  0631 01/10/24  0600 01/09/24  0714 01/08/24  1431   SODIUM mmol/L 140 140 143 143   POTASSIUM mmol/L 3.8 3.5 3.7 3.5   CHLORIDE mmol/L 104 107 110* 106   CO2 mmol/L 28 25 28 31   BUN mg/dL 10 11 11 11   CREATININE mg/dL 0.79 0.78 0.74 0.86   CALCIUM mg/dL 8.6 8.4* 8.2* 8.7   PHOSPHORUS mg/dL 3.7 3.8 3.4  --    MAGNESIUM mg/dL  --  2.07 2.05 2.09   BILIRUBIN TOTAL mg/dL  --   --   --  0.5   ALT U/L  --   --   --  8   AST U/L  --   --   --  14         ECG 12 lead    Result Date: 1/9/2024  Sinus bradycardia T wave abnormality, consider inferior ischemia When compared with ECG of 08-JAN-2024 14:22, (unconfirmed) T wave inversion more evident in Anterior leads Reconfirmed by Patric Flores ([id number]) on 1/9/2024 5:38:17 PM    Transthoracic Echo (TTE) Complete    Result Date: 1/9/2024            [facility] [place] [address] [id number]    Tel [phone] Fax [phone] TRANSTHORACIC ECHOCARDIOGRAM REPORT  Patient Name:      OPAL HUFF       Reading Physician:    [id number] Patric Flores MD Study Date:        1/9/2024            Ordering Provider:    [id number] CHRISTIE PATEL MRN/PID:           [id number]            Fellow: Accession#:        [id number]        Nurse: Date of Birth/Age: 1945 / 78     Sonographer:          Madyson Sánchez RDCS                    years Gender:            F                   Additional Staff: Height:            167.00 cm           Admit Date: Weight:            68.95 kg            Admission Status:     Observation -                                                               Priority discharge BSA:               1.78 m2             Department Location:  [facility] Echo Lab Blood Pressure: 148 /73 mmHg Study Type:    TRANSTHORACIC ECHO (TTE) COMPLETE Diagnosis/ICD: Syncope and collapse-R55 Indication:    syncope and collapse  Study Detail: The following Echo studies were performed: 2D, M-Mode, Doppler and               color flow.  PHYSICIAN INTERPRETATION: Left Ventricle: Left ventricular systolic function is normal, with an estimated ejection fraction of 65-70%. Estimated left ventricular mass is normal. There are no regional wall motion abnormalities. The left ventricular cavity size is normal. The left ventricular septal wall thickness is normal. There is normal left ventricular posterior wall thickness. Spectral Doppler shows a normal pattern of left ventricular diastolic filling. There are normal left ventricular filling pressures. LV Wall Scoring: All segments are normal. Left Atrium: The left atrium is normal in size. Right Ventricle: The right ventricle is normal in size. There is normal right ventricular global systolic function. Right Atrium: The right atrium is normal in size. Aortic Valve: The aortic valve is trileaflet. There is no evidence of aortic valve regurgitation. The peak instantaneous gradient of the aortic valve is 8.0 mmHg. Mitral Valve: The mitral valve is normal in structure. There is no evidence of mitral valve regurgitation. Tricuspid Valve: The tricuspid valve is structurally normal. There is trace tricuspid regurgitation. The Doppler estimated RVSP is within normal limits at 26.8 mmHg. Pulmonic Valve: The pulmonic valve is structurally normal. There is no indication of pulmonic valve regurgitation. Pericardium: There is no pericardial effusion noted. Aorta: The aortic root is normal. There is no dilatation of the aortic arch. There is no dilatation of the ascending aorta. There is no dilatation of the aortic root. Systemic Veins:  The inferior vena cava appears to be of normal size. The hepatic vein appears to be of normal size. There is IVC inspiratory collapse greater than 50%. The hepatic vein shows a normal flow pattern.  CONCLUSIONS:  1. Left ventricular systolic function is normal with a 65-70% estimated ejection fraction.  2. RVSP within normal limits. QUANTITATIVE DATA SUMMARY: 2D MEASUREMENTS:                          Normal Ranges: LAs:           3.70 cm   (2.7-4.0cm) IVSd:          1.10 cm   (0.6-1.1cm) LVPWd:         1.00 cm   (0.6-1.1cm) LVIDd:         4.00 cm   (3.9-5.9cm) LVIDs:         2.80 cm LV Mass Index: 76.7 g/m2 LV % FS        30.0 % LA VOLUME:                             Normal Ranges: LA Area A4C:     17.0 cm2 LA Area A2C:     18.0 cm2 LA Volume Index: 27.0 ml/m2 M-MODE MEASUREMENTS:                  Normal Ranges: Ao Root: 2.70 cm (2.0-3.7cm) AoV Exc: 2.20 cm (1.5-2.5cm) AORTA MEASUREMENTS:                    Normal Ranges: AoV Exc:   2.20 cm (1.5-2.5cm) Asc Ao, d: 3.00 cm (2.1-3.4cm) LV SYSTOLIC FUNCTION BY 2D PLANIMETRY (MOD):                     Normal Ranges: EF-A4C View: 74.0 % (>=55%) EF-A2C View: 66.4 % EF-Biplane:  69.4 % LV DIASTOLIC FUNCTION:                        Normal Ranges: MV Peak E:    1.16 m/s (0.7-1.2 m/s) MV Peak A:    0.80 m/s (0.42-0.7 m/s) E/A Ratio:    1.45     (1.0-2.2) MV e'         0.08 m/s (>8.0) MV lateral e' 0.08 m/s MV medial e'  0.07 m/s E/e' Ratio:   15.47    (<8.0) MITRAL VALVE:                 Normal Ranges: MV DT: 236 msec (150-240msec) AORTIC VALVE:                         Normal Ranges: AoV Vmax:      1.41 m/s (<=1.7m/s) AoV Peak P.0 mmHg (<20mmHg) LVOT Max Pelon:  1.30 m/s (<=1.1m/s) LVOT Diameter: 2.10 cm  (1.8-2.4cm) AoV Area,Vmax: 3.19 cm2 (2.5-4.5cm2)  RIGHT VENTRICLE: RV Basal 3.00 cm RV Mid   2.30 cm RV Major 5.8 cm TAPSE:   17.9 mm RV s'    0.11 m/s TRICUSPID VALVE/RVSP:                             Normal Ranges: Peak TR Velocity: 2.44 m/s RV Syst Pressure: 26.8 mmHg  (< 30mmHg)  [id number] Patric Flores MD Electronically signed on 1/9/2024 at 3:51:11 PM  Wall Scoring  ** Final **     CT abdomen pelvis w IV contrast    Result Date: 1/9/2024  Interpreted By:  Jaswant Mayorga, STUDY: CT ABDOMEN PELVIS W IV CONTRAST;  1/9/2024 2:27 pm   INDICATION: Signs/Symptoms:Concern for intraabdominal malgnancy..   COMPARISON: 09/17/2018   ACCESSION NUMBER(S): [id number]   ORDERING CLINICIAN: TAHMINA CHASE   TECHNIQUE: CT of the abdomen and pelvis was performed.  Contiguous axial images were obtained at 3 mm slice thickness through the abdomen and pelvis. Coronal and sagittal reconstructions at 3 mm slice thickness were performed. 75 mL Omnipaque 350 ntrast administered intravenously without immediate complication.   FINDINGS: LOWER CHEST: Respiratory motion. Bibasilar atelectasis. Coronary atherosclerosis. Trace left pleural effusion.   ABDOMEN:   LIVER: There are numerous new hypoenhancing masses scattered throughout every segment of the liver measuring up to 29 mm in segment IV B.   BILE DUCTS: Not dilated.   GALLBLADDER: Cholelithiasis.   PANCREAS: Numerous clustered calcifications in the uncinate process suggestive of chronic pancreatitis. Otherwise unremarkable.   SPLEEN: Unremarkable   ADRENAL GLANDS: New bilateral adrenal masses measuring 4.7 cm on the left and 3.8 cm on the right.   KIDNEYS AND URETERS: Unremarkable without hydronephrosis.   PELVIS:   BLADDER: Nearly empty.   REPRODUCTIVE ORGANS: Uterus is present. Pelvic varices. 3 cm fat containing left ovarian mass compatible with teratoma.   BOWEL: Severe distal colonic diverticulosis. No findings of bowel obstruction or inflammation. Appendix not identified. Unremarkable mesentery.   VESSELS: Aortoiliac system is patent but severely atherosclerotic. 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 IVC and major branches are grossly patent. Major portal venous branches are patent.    PERITONEUM AND RETROPERITONEUM: No free fluid or free air. 7 mm nodule posterior to the left psoas muscle just below the pelvic inlet. A 4 mm nodule is noted slightly more laterally. No abdominal or pelvic lymphadenopathy.   BONE AND ABDOMINAL WALL: There are numerous new scattered ill-defined sclerotic lesions involving the pelvis and proximal femora. Mild anterior wedging at T12 grossly appears chronic. Degenerative changes of the spine.       1. Numerous hepatic, bilateral adrenal, and pelvic/proximal femur osteoblastic metastases. A few subcentimeter left retroperitoneal nodules may also be metastatic. 2. Abdominal aortic aneurysm is slightly enlarged from 3.2 cm to 3.5 cm compared to 5 years ago.   MACRO: None.   Signed by: Jaswant Mayorga 1/9/2024 2:52 PM Dictation workstation:   [id number]    MR brain w and wo IV contrast    Result Date: 1/9/2024  Interpreted By:  Pito Zepeda  and Slick Jonas STUDY: MR BRAIN W AND WO IV CONTRAST;  1/9/2024 12:49 pm   INDICATION: Signs/Symptoms:transient syncope, possible metastasis.   COMPARISON: MRI   ACCESSION NUMBER(S): [id number]   ORDERING CLINICIAN: PAMELA LYNN   TECHNIQUE: Axial T2, FLAIR, DWI, gradient echo T2 and sagittal and coronal T1 weighted images of brain were acquired. Post contrast T1 weighted images were acquired after administration of 14 mL Dotarem gadolinium based intravenous contrast.   FINDINGS: Evaluation somewhat limited due to patient motion artifact.   CSF Spaces: Diffuse sulcal and ventricular prominence, at least in part secondary age-related parenchymal volume loss. No extra-axial fluid collection.   Parenchyma: There is no diffusion restriction abnormality to suggest acute infarct.  Nonspecific T2/FLAIR hyperintense signal throughout the subcortical and periventricular white matter, similar to prior exam, which may be sequelae chronic small-vessel ischemic disease. Otherwise no abnormal parenchymal signal abnormalities. Redemonstration of  encephalomalacia/gliosis involving right occipital lobe, similar to prior exam. There is no mass effect or midline shift. No abnormal areas of enhancement.   Paranasal Sinuses and Mastoids: Visualized paranasal sinuses and mastoid air cells are unremarkable.       1. No evidence of metastatic disease within the brain was identified. 2. Chronic findings as detailed above.   I personally reviewed the images/study and I agree with the findings as stated. This study was interpreted at [facility], [place], [place].   MACRO: None   Signed by: Pito Zepeda 1/9/2024 1:49 PM Dictation workstation:   [id number]    CT angio chest for pulmonary embolism    Result Date: 1/8/2024  Interpreted By:  Ruben Whitten, STUDY: CT ANGIO CHEST FOR PULMONARY EMBOLISM;  1/8/2024 5:50 pm   INDICATION: Signs/Symptoms:syncope, elevated dimer, also further elucidation of lung mass seen on c spine CT.   COMPARISON: None.   ACCESSION NUMBER(S): [id number]   ORDERING CLINICIAN: JOE ESQUIVEL   TECHNIQUE: Contiguous axial images of the chest were obtained after the intravenous administration of 60 mL Omnipaque 350 contrast using angiographic PE protocol. Coronal and sagittal reformatted images were reconstructed from the axial data. MIP images were created on an independent workstation and reviewed.   FINDINGS: PULMONARY ARTERIES: Adequate opacification to the level of the mid segmental arteries. The distal segmental and subsegmental arteries, particularly in the left upper lobe limited by mixing artifact and volume loss/consolidation. No filling defect to suggest pulmonary embolus in the visualized pulmonary arteries. The main pulmonary artery is normal in diameter. There is reflux of contrast into the hepatic venous circuit suggesting impaired right heart function.   HEART: Normal in size. Mild to moderate triple-vessel coronary vascular calcifications. No significant pericardial effusion.   VESSELS: Normal  caliber aorta without dissection. Moderate atherosclerotic calcifications of the aortic arch and descending thoracic aorta.   MEDIASTINUM AND LYMPH NODES: Enlarged prevascular lymph nodes measuring up to 2.2 x 1.6 cm. The visualized thyroid is within normal limits. No pneumomediastinum. The esophagus appears within normal limits.   LUNG, AIRWAYS, AND PLEURA: The trachea and mainstem bronchi are patent. The left upper lobe bronchi are occluded proximally. Dense consolidation involving the majority of the left upper lobe with associated volume loss. There is a relatively low-density region centrally within the consolidation which appears to exert mass effect upon the adjacent bronchovascular structures, raising concern for underlying mass. Paraseptal and centrilobular emphysematous changes in the lung apices. No pleural effusion or pneumothorax.   OSSEOUS STRUCTURES: No acute osseous abnormality.   CHEST WALL SOFT TISSUES: 1.2 cm nodule in the superficial subcutaneous soft tissues of the midline upper chest, possibly a sebaceous cyst. 0.8 cm nodule in the subcutaneous fat of the medial aspect of the left breast. Recommend mammographic correlation.   UPPER ABDOMEN/OTHER: Probable enlarged lymph node in the gastrohepatic ligament, suboptimally visualized. Diffuse thickening/enlargement of the bilateral adrenal glands concerning for adrenal metastases.       1. No evidence of pulmonary embolus to the level of the mid segmental arteries. The distal segmental arteries are suboptimally assessed as detailed above. 2. Dense left upper lobe consolidation with occlusion of the left upper lobe bronchi proximally. Areas of relatively low-density which appear to displace the adjacent bronchovascular structures within the central part of the consolidation raising concern for underlying mass. There is likely superimposed volume loss and postobstructive pneumonia. 3. Enlarged prevascular lymph nodes measuring up to 2.2 cm. Prominent  right paratracheal nodes measuring up to 1 cm. 4. Partially visualized bilateral adrenal masses measuring at least 4.4 cm on the left and 2.8 cm in the right concerning for adrenal metastases. Recommend dedicated CT of the abdomen and pelvis. 5. A few scattered nodularities within the left breast, recommend mammographic correlation.   MACRO: Critical Finding:  See findings. Notification was initiated on 1/8/2024 at 6:21 pm by  Ruben Whitten.  (**-YCF-**) Instructions:   Signed by: Ruben Whitten 1/8/2024 6:22 PM Dictation workstation:   [id number]       Assessment/Plan   Principal Problem:    Mass of left lung  Active Problems:    Benign essential hypertension    CAD S/P percutaneous coronary angioplasty    Gastroesophageal reflux disease    Generalized anxiety disorder    Patient is a 78 y.o. 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 Patient is asymptomatic from a respiratory prospective without wheeze, cough, or dyspnea.     #Left Lung mass  #Left upper lobe lung collapse   #B/l adrenal masses concerning for metastasis  #Current Smoker  -Recommend biopsy of Adrenal gland via IR   -Bronchoscopy (1/10) showed large fungating mass in L lobe obstructing airway. Biopsies x3 taken with minimal bleeding.  -Will call patient with follow up pathology results  -Smoking cessation  -Follow up with Oncology outpatient with Dr. Jose Jones for discharge from pulmonary standpoint     Reviewed with attending,  Pritesh Leigh D.O.  Internal Medicine, PGY-1

## 2024-01-11 NOTE — DISCHARGE INSTRUCTIONS
Please call your primary care provider Dr. Garza and schedule a follow up appointment in 2-3 days.   Please call  and schedule follow-up appointment in 1 week.  Please discuss your biopsy results during this visit.  Please call Dr. Garcia and schedule a follow-up appointment in 1 week.  Please discuss your biopsy results during this visit.  Please call Dr. Barksdale and schedule a follow-up appointment in 2 weeks.  He will be given a Holter monitor that we will collect information about your heart rhythm.  You will discuss any events seen on the monitor during this visit.  Please return to [facility] on 1/23 for your scheduled adrenal gland biopsy with the interventional radiology department.    Please take all of your medications as directed.  Please stop taking aspirin.    If you have any concerning symptoms, or worsening symptoms please call or return, such as chest pain, shortness of breath, new onset confusion, loss of sensation, or fever >103F.    Thank you for allowing us to participate in your health care.    -[facility] Inpatient Medicine Teaching Service.

## 2024-01-11 NOTE — NURSING NOTE
1140: Pt. Refusing bed alarm at this time. Charge nurse and nurse manager made aware. Pt. Educated on importance of bed alarm and pt. Safety to prevent falls. Call light within reach. Bed in lowest position    1250:  O2 removed to attempt to wean oxygen requirements. Pt. Spo2 87% on RA - 2L NC reapplied, pt SpO2 returned to 95%    Oxygen removed while pt sitting in bed and spo2 dropped to 87% 2LNC reapplied and pt. Spo2 95% on 2L. MD notified.         EOS: Pt. To be discharged home this evening. Pt. Had no acute changes throughout this shift. Pt. Attempted to be weaned to RA but Spo2 dropping as charted. Pt. To be discharged home with home O2 and come back in tomorrow to get Holter monitor placed as outpt per Dr. Velázquez. Pt. Poa called and instructions given to call in the morning and come in to get heart monitor placed. Pt. IV removed and tele monitor removed. Pt. To follow up with consulting physicians. Pt. Reporting no further questions at this time on discharge.

## 2024-01-11 NOTE — HOSPITAL COURSE
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 At the ED, CT spine/head showed no evidence of acute fracture acute cortical infarct or intracranial hemorrhage, and Chest x-ray and CTA showed dense left upper lobe consolidation. EKG showed sinus bradycardia, unchanged from baseline.    For her transient syncope, likely due to severe bradycardia or vagus nerve compression secondary to lung mass, patient will be discharged with a 2-week cardiac event monitor (per EP recommendation) to document her rhythm with any additional symptomatic spells, and to establish chronotropic competence. TTE on 1/9 showed normal LV function, 65-70% EF, and RVSP within normal limits. Follow with EP outpatient.    For her left upper lobe mass, patient additionally had a CT abdomen/pelvis that showed numerous hepatic, bilateral adrenal, and pelvic/proximal femur osteoblastic metastases. MR brain w and wo IV contrast showed no evidence of metastatic disease within the brain. She had a bronchoscopy done on 1/10 that showed large fungating mass in left lobe obstructing airway and 3 biopsies were taken. She will follow with Dr. Garcia ([facility]) outpatient. Adrenal biopsy via IR scheduled for 1//23. Aspirin was discontinued.      Additionally, patient was hypoxemic today at 87% on room air. CXR was ordered showing no change in left hilar fullness with left upper lobe collapse and no new infiltrate or pneumothorax. Patient will follow up with pulmonology outpatient and will discharged home with oxygen therapy.    Continue patient home medications as appropriate for HTN, HLD, GERD, and general anxiety. Continue patient's home Synthroid 75 mcg daily for hypothyroidism.  Plan for  patient to follow up with her PCP.

## 2024-01-11 NOTE — NURSING NOTE
Along with Domi PARKS, this nurse spoke with pt & pts daughter Sintia via phone regarding DC planning. Confirmed with Sintia & pt they will call biometrics in am to set up appointment for Holter placement. Also discussed new home going oxygen 2L NC. The verbalized their understanding to call [facility] once they arrive home. Team has been updated on the DC plan as well.

## 2024-01-11 NOTE — PROGRESS NOTES
Spiritual Care Visit    Clinical Encounter Type  Visited With: Patient  Routine Visit: Introduction  Continue Visiting: Yes    Sabianist Encounters  Sabianist Needs: Literature         Sacramental Encounters  Communion: Ask the patient  Communion Given Indicator: No    Patient Spiritual Care Encounters  Suffering Severity: Substantial                        Taxonomy  Intended Effects: Establish rapport and connectedness, Agnieszka affirmation, Build relationship of care and support, Demonstrate caring and concern, Lessen anxiety    For the moment,  she declined my visit.

## 2024-01-11 NOTE — PROGRESS NOTES
Claude Monaco is a 78 y.o. female on day 3 of admission presenting with Mass of left lung.      Subjective     Patient had an episode of asymptomatic bradycardia (low 40s on telemetry) overnight. She denied SOB, lightheadedness, chest pain or headache.      Objective     Last Recorded Vitals  /58 (BP Location: Left arm, Patient Position: Sitting)   Pulse 51   Temp 36.5 °C (97.7 °F) (Temporal)   Resp 18   Wt 68.9 kg (152 lb)   SpO2 95%   Intake/Output last 3 Shifts:    Intake/Output Summary (Last 24 hours) at 1/11/2024 1036  Last data filed at 1/10/2024 1900  Gross per 24 hour   Intake 1140 ml   Output --   Net 1140 ml       Admission Weight  Weight: 68.9 kg (152 lb) (01/08/24 2051)    Daily Weight  01/08/24 : 68.9 kg (152 lb)    Image Results  Bronchoscopy Diagnostic, w BAL, w Endobronch Bx  Table formatting from the original result was not included.  Impression  The trachea, main abbie, right main stem, RUL, bronchus intermedius, RML   and RLL appeared normal.  Mass (not traversable) in the TRUMAN proximal airway with complete   obstruction of TRUMAN  Performed endobronchial biopsies in the TRUMAN mass; applied hemostatic agent   to control bleeding; hemostasis achieved  Bronchoalveolar washing was performed in the TRUMAN; the fluid appeared   bloody    Findings  The trachea, main abbie, right main stem, RUL, bronchus intermedius, RML   and RLL appeared normal.  Friable fungating mass (not traversable) with poorly defined and irregular   margins in the TRUMAN with complete obstruction with minimal amount of   bleeding before and after intervention  Performed endobronchial biopsies in the TRUMAN with minimal amount of   bleeding before and after intervention; applied cold saline and   epinephrine to control bleeding; hemostasis achieved  Bronchoalveolar lavage was performed in the TRUMAN with 60 mL of saline   instilled and a total return of 30 mL; the fluid appeared bloody    Recommendation   Await pathology results     Follow up with referring physician    Follow up with Oncology       Indication  Lung mass    Staff  Staff Role   Georgina Herrera MD    Pulmonologist     Medications  See Anesthesia Record.     Preprocedure  A history and physical has been performed, and patient medication   allergies have been reviewed. The patient's tolerance of previous   anesthesia has been reviewed. The risks and benefits of the procedure and   the sedation options and risks were discussed with the patient. All   questions were answered and informed consent obtained.    Details of the Procedure  The patient underwent general anesthesia, which was administered by an   anesthesia professional. The patient's blood pressure, heart rate, level   of consciousness, oxygen, respirations, ECG and ETCO2 were monitored   throughout the procedure. The patient's estimated blood loss was minimal   (<5 mL). The scope was introduced through the endotracheal tube. The   procedure was not difficult. The patient tolerated the procedure well.   There were no apparent adverse events.     Events  Procedure Events   Event Event Time   ENDO SCOPE IN TIME 1/10/2024  2:01 PM   ENDO SCOPE OUT TIME 1/10/2024  2:34 PM     Specimens  ID Type Source Tests Collected by Time   1 : left upper lobe mass biopsy Tissue LUNG BIOPSY LEFT (SPECIFY SITE)   SURGICAL PATHOLOGY EXAM Araceli Crowe RN 1/10/2024 1406     Procedure Location  [address] [address] [phone]    Referring Provider  No referring provider defined for this encounter.    Procedure Provider  No name on file      Physical Exam  Constitutional:       General: She is not in acute distress.     Appearance: Normal appearance. She is not ill-appearing.   HENT:      Head: Normocephalic and atraumatic.      Comments: Left occipital parietal hematoma, improved     Mouth/Throat:      Mouth: Mucous membranes are moist.   Cardiovascular:       Rate and Rhythm: Regular rhythm. Bradycardia present.      Pulses: Normal pulses.      Heart sounds: Normal heart sounds. No murmur heard.  Pulmonary:      Effort: Pulmonary effort is normal. No respiratory distress.      Breath sounds: Normal breath sounds. No wheezing.   Abdominal:      General: Abdomen is flat. Bowel sounds are normal. There is no distension.      Palpations: Abdomen is soft.      Tenderness: There is no abdominal tenderness.   Skin:     General: Skin is warm and dry.   Neurological:      General: No focal deficit present.      Mental Status: She is alert and oriented to person, place, and time.         Relevant Results    Scheduled medications  [Held by provider] aspirin, 81 mg, oral, Daily  atorvastatin, 80 mg, oral, Daily  cholecalciferol, 1,000 Units, oral, Daily  [Held by provider] enoxaparin, 40 mg, subcutaneous, q24h  escitalopram, 20 mg, oral, Daily  famotidine, 20 mg, oral, Daily  levothyroxine, 75 mcg, oral, Daily  lisinopril, 2.5 mg, oral, Daily  perflutren lipid microspheres, 0.5-10 mL of dilution, intravenous, Once in imaging  perflutren protein A microsphere, 0.5 mL, intravenous, Once in imaging  polyethylene glycol, 17 g, oral, Daily  sulfur hexafluoride microsphr, 2 mL, intravenous, Once in imaging      Continuous medications     PRN medications  PRN medications: nitroglycerin, oxygen    Results for orders placed or performed during the hospital encounter of 01/08/24 (from the past 24 hour(s))   CBC and Auto Differential   Result Value Ref Range    WBC 6.6 4.4 - 11.3 x10*3/uL    nRBC 0.0 0.0 - 0.0 /100 WBCs    RBC 4.00 4.00 - 5.20 x10*6/uL    Hemoglobin 11.2 (L) 12.0 - 16.0 g/dL    Hematocrit 36.7 36.0 - 46.0 %    MCV 92 80 - 100 fL    MCH 28.0 26.0 - 34.0 pg    MCHC 30.5 (L) 32.0 - 36.0 g/dL    RDW 14.0 11.5 - 14.5 %    Platelets 194 150 - 450 x10*3/uL    Neutrophils % 74.7 40.0 - 80.0 %    Immature Granulocytes %, Automated 0.5 0.0 - 0.9 %    Lymphocytes % 14.7 13.0 - 44.0  %    Monocytes % 9.9 2.0 - 10.0 %    Eosinophils % 0.0 0.0 - 6.0 %    Basophils % 0.2 0.0 - 2.0 %    Neutrophils Absolute 4.93 1.60 - 5.50 x10*3/uL    Immature Granulocytes Absolute, Automated 0.03 0.00 - 0.50 x10*3/uL    Lymphocytes Absolute 0.97 0.80 - 3.00 x10*3/uL    Monocytes Absolute 0.65 0.05 - 0.80 x10*3/uL    Eosinophils Absolute 0.00 0.00 - 0.40 x10*3/uL    Basophils Absolute 0.01 0.00 - 0.10 x10*3/uL   Renal Function Panel   Result Value Ref Range    Glucose 117 (H) 74 - 99 mg/dL    Sodium 140 136 - 145 mmol/L    Potassium 3.8 3.5 - 5.3 mmol/L    Chloride 104 98 - 107 mmol/L    Bicarbonate 28 21 - 32 mmol/L    Anion Gap 12 10 - 20 mmol/L    Urea Nitrogen 10 6 - 23 mg/dL    Creatinine 0.79 0.50 - 1.05 mg/dL    eGFR 78 >60 mL/min/1.73m*2    Calcium 8.6 8.6 - 10.3 mg/dL    Phosphorus 3.7 2.5 - 4.9 mg/dL    Albumin 3.6 3.4 - 5.0 g/dL     Bronchoscopy Diagnostic, w BAL, w Endobronch Bx    Result Date: 1/10/2024  Table formatting from the original result was not included. Impression The trachea, main abbie, right main stem, RUL, bronchus intermedius, RML and RLL appeared normal. Mass (not traversable) in the TRUMAN proximal airway with complete obstruction of TRUMAN Performed endobronchial biopsies in the TRUMAN mass; applied hemostatic agent to control bleeding; hemostasis achieved Bronchoalveolar washing was performed in the TRUMAN; the fluid appeared bloody Findings The trachea, main abbie, right main stem, RUL, bronchus intermedius, RML and RLL appeared normal. Friable fungating mass (not traversable) with poorly defined and irregular margins in the TRUMAN with complete obstruction with minimal amount of bleeding before and after intervention Performed endobronchial biopsies in the TRUMAN with minimal amount of bleeding before and after intervention; applied cold saline and epinephrine to control bleeding; hemostasis achieved Bronchoalveolar lavage was performed in the TRUMAN with 60 mL of saline instilled and a total return  of 30 mL; the fluid appeared bloody Recommendation  Await pathology results  Follow up with referring physician  Follow up with Oncology  Indication Lung mass Staff Staff Role Georgina Herrera MD    Pulmonologist Medications See Anesthesia Record. Preprocedure A history and physical has been performed, and patient medication allergies have been reviewed. The patient's tolerance of previous anesthesia has been reviewed. The risks and benefits of the procedure and the sedation options and risks were discussed with the patient. All questions were answered and informed consent obtained. Details of the Procedure The patient underwent general anesthesia, which was administered by an anesthesia professional. The patient's blood pressure, heart rate, level of consciousness, oxygen, respirations, ECG and ETCO2 were monitored throughout the procedure. The patient's estimated blood loss was minimal (<5 mL). The scope was introduced through the endotracheal tube. The procedure was not difficult. The patient tolerated the procedure well. There were no apparent adverse events. Events Procedure Events Event Event Time ENDO SCOPE IN TIME 1/10/2024  2:01 PM ENDO SCOPE OUT TIME 1/10/2024  2:34 PM Specimens ID Type Source Tests Collected by Time 1 : left upper lobe mass biopsy Tissue LUNG BIOPSY LEFT (SPECIFY SITE) SURGICAL PATHOLOGY EXAM Araceli Crowe RN 1/10/2024 1406 Procedure Location [address] [address] [phone] Referring Provider No referring provider defined for this encounter. Procedure Provider No name on file     ECG 12 lead    Result Date: 1/9/2024  Sinus bradycardia Otherwise normal ECG When compared with ECG of 30-DEC-2021 13:51, ST no longer elevated in Inferior leads T wave inversion no longer evident in Inferior leads Reconfirmed by Patric Flores ([id number]) on 1/9/2024 5:41:52 PM    ECG 12 lead    Result Date: 1/9/2024  Sinus  bradycardia T wave abnormality, consider inferior ischemia When compared with ECG of 08-JAN-2024 14:22, (unconfirmed) T wave inversion more evident in Anterior leads Reconfirmed by Patric Flores ([id number]) on 1/9/2024 5:38:17 PM    Transthoracic Echo (TTE) Complete    Result Date: 1/9/2024            [facility] [address], [facility] [address]    Tel [phone] Fax [phone] TRANSTHORACIC ECHOCARDIOGRAM REPORT  Patient Name:      OPAL HUFF       Reading Physician:    [id number] Patric Flores MD Study Date:        1/9/2024            Ordering Provider:    [id number] CHRISTIE PATEL MRN/PID:           [id number]            Fellow: Accession#:        [id number]        Nurse: Date of Birth/Age: 1945 / 78     Sonographer:          Madyson Sánchez RDCS                    years Gender:            F                   Additional Staff: Height:            167.00 cm           Admit Date: Weight:            68.95 kg            Admission Status:     Observation -                                                              Priority discharge BSA:               1.78 m2             Department Location:  [facility] Echo Lab Blood Pressure: 148 /73 mmHg Study Type:    TRANSTHORACIC ECHO (TTE) COMPLETE Diagnosis/ICD: Syncope and collapse-R55 Indication:    syncope and collapse  Study Detail: The following Echo studies were performed: 2D, M-Mode, Doppler and               color flow.  PHYSICIAN INTERPRETATION: Left Ventricle: Left ventricular systolic function is normal, with an estimated ejection fraction of 65-70%. Estimated left ventricular mass is normal. There are no regional wall motion abnormalities. The left ventricular cavity size is normal. The left ventricular septal wall thickness is normal. There is normal left ventricular posterior wall thickness. Spectral Doppler shows a normal  pattern of left ventricular diastolic filling. There are normal left ventricular filling pressures. LV Wall Scoring: All segments are normal. Left Atrium: The left atrium is normal in size. Right Ventricle: The right ventricle is normal in size. There is normal right ventricular global systolic function. Right Atrium: The right atrium is normal in size. Aortic Valve: The aortic valve is trileaflet. There is no evidence of aortic valve regurgitation. The peak instantaneous gradient of the aortic valve is 8.0 mmHg. Mitral Valve: The mitral valve is normal in structure. There is no evidence of mitral valve regurgitation. Tricuspid Valve: The tricuspid valve is structurally normal. There is trace tricuspid regurgitation. The Doppler estimated RVSP is within normal limits at 26.8 mmHg. Pulmonic Valve: The pulmonic valve is structurally normal. There is no indication of pulmonic valve regurgitation. Pericardium: There is no pericardial effusion noted. Aorta: The aortic root is normal. There is no dilatation of the aortic arch. There is no dilatation of the ascending aorta. There is no dilatation of the aortic root. Systemic Veins: The inferior vena cava appears to be of normal size. The hepatic vein appears to be of normal size. There is IVC inspiratory collapse greater than 50%. The hepatic vein shows a normal flow pattern.  CONCLUSIONS:  1. Left ventricular systolic function is normal with a 65-70% estimated ejection fraction.  2. RVSP within normal limits. QUANTITATIVE DATA SUMMARY: 2D MEASUREMENTS:                          Normal Ranges: LAs:           3.70 cm   (2.7-4.0cm) IVSd:          1.10 cm   (0.6-1.1cm) LVPWd:         1.00 cm   (0.6-1.1cm) LVIDd:         4.00 cm   (3.9-5.9cm) LVIDs:         2.80 cm LV Mass Index: 76.7 g/m2 LV % FS        30.0 % LA VOLUME:                             Normal Ranges: LA Area A4C:     17.0 cm2 LA Area A2C:     18.0 cm2 LA Volume Index: 27.0 ml/m2 M-MODE MEASUREMENTS:                   Normal Ranges: Ao Root: 2.70 cm (2.0-3.7cm) AoV Exc: 2.20 cm (1.5-2.5cm) AORTA MEASUREMENTS:                    Normal Ranges: AoV Exc:   2.20 cm (1.5-2.5cm) Asc Ao, d: 3.00 cm (2.1-3.4cm) LV SYSTOLIC FUNCTION BY 2D PLANIMETRY (MOD):                     Normal Ranges: EF-A4C View: 74.0 % (>=55%) EF-A2C View: 66.4 % EF-Biplane:  69.4 % LV DIASTOLIC FUNCTION:                        Normal Ranges: MV Peak E:    1.16 m/s (0.7-1.2 m/s) MV Peak A:    0.80 m/s (0.42-0.7 m/s) E/A Ratio:    1.45     (1.0-2.2) MV e'         0.08 m/s (>8.0) MV lateral e' 0.08 m/s MV medial e'  0.07 m/s E/e' Ratio:   15.47    (<8.0) MITRAL VALVE:                 Normal Ranges: MV DT: 236 msec (150-240msec) AORTIC VALVE:                         Normal Ranges: AoV Vmax:      1.41 m/s (<=1.7m/s) AoV Peak P.0 mmHg (<20mmHg) LVOT Max Pelon:  1.30 m/s (<=1.1m/s) LVOT Diameter: 2.10 cm  (1.8-2.4cm) AoV Area,Vmax: 3.19 cm2 (2.5-4.5cm2)  RIGHT VENTRICLE: RV Basal 3.00 cm RV Mid   2.30 cm RV Major 5.8 cm TAPSE:   17.9 mm RV s'    0.11 m/s TRICUSPID VALVE/RVSP:                             Normal Ranges: Peak TR Velocity: 2.44 m/s RV Syst Pressure: 26.8 mmHg (< 30mmHg)  [id number] Patric Flores MD Electronically signed on 2024 at 3:51:11 PM  Wall Scoring  ** Final **     CT abdomen pelvis w IV contrast    Result Date: 2024  Interpreted By:  Jawsant Mayorga, STUDY: CT ABDOMEN PELVIS W IV CONTRAST;  2024 2:27 pm   INDICATION: Signs/Symptoms:Concern for intraabdominal malgnancy..   COMPARISON: 2018   ACCESSION NUMBER(S): [id number]   ORDERING CLINICIAN: TAHMINA CHASE   TECHNIQUE: CT of the abdomen and pelvis was performed.  Contiguous axial images were obtained at 3 mm slice thickness through the abdomen and pelvis. Coronal and sagittal reconstructions at 3 mm slice thickness were performed. 75 mL Omnipaque 350 ntrast administered intravenously without immediate complication.   FINDINGS: LOWER CHEST: Respiratory motion. Bibasilar  atelectasis. Coronary atherosclerosis. Trace left pleural effusion.   ABDOMEN:   LIVER: There are numerous new hypoenhancing masses scattered throughout every segment of the liver measuring up to 29 mm in segment IV B.   BILE DUCTS: Not dilated.   GALLBLADDER: Cholelithiasis.   PANCREAS: Numerous clustered calcifications in the uncinate process suggestive of chronic pancreatitis. Otherwise unremarkable.   SPLEEN: Unremarkable   ADRENAL GLANDS: New bilateral adrenal masses measuring 4.7 cm on the left and 3.8 cm on the right.   KIDNEYS AND URETERS: Unremarkable without hydronephrosis.   PELVIS:   BLADDER: Nearly empty.   REPRODUCTIVE ORGANS: Uterus is present. Pelvic varices. 3 cm fat containing left ovarian mass compatible with teratoma.   BOWEL: Severe distal colonic diverticulosis. No findings of bowel obstruction or inflammation. Appendix not identified. Unremarkable mesentery.   VESSELS: Aortoiliac system is patent but severely atherosclerotic. 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 IVC and major branches are grossly patent. Major portal venous branches are patent.   PERITONEUM AND RETROPERITONEUM: No free fluid or free air. 7 mm nodule posterior to the left psoas muscle just below the pelvic inlet. A 4 mm nodule is noted slightly more laterally. No abdominal or pelvic lymphadenopathy.   BONE AND ABDOMINAL WALL: There are numerous new scattered ill-defined sclerotic lesions involving the pelvis and proximal femora. Mild anterior wedging at T12 grossly appears chronic. Degenerative changes of the spine.       1. Numerous hepatic, bilateral adrenal, and pelvic/proximal femur osteoblastic metastases. A few subcentimeter left retroperitoneal nodules may also be metastatic. 2. Abdominal aortic aneurysm is slightly enlarged from 3.2 cm to 3.5 cm compared to 5 years ago.   MACRO: None.   Signed by: Jaswant Mayorga 1/9/2024 2:52 PM Dictation workstation:    [id number]    MR brain w and wo IV contrast    Result Date: 1/9/2024  Interpreted By:  Pito Zepeda,  and Slick Jonas STUDY: MR BRAIN W AND WO IV CONTRAST;  1/9/2024 12:49 pm   INDICATION: Signs/Symptoms:transient syncope, possible metastasis.   COMPARISON: MRI   ACCESSION NUMBER(S): [id number]   ORDERING CLINICIAN: PAMELA LYNN   TECHNIQUE: Axial T2, FLAIR, DWI, gradient echo T2 and sagittal and coronal T1 weighted images of brain were acquired. Post contrast T1 weighted images were acquired after administration of 14 mL Dotarem gadolinium based intravenous contrast.   FINDINGS: Evaluation somewhat limited due to patient motion artifact.   CSF Spaces: Diffuse sulcal and ventricular prominence, at least in part secondary age-related parenchymal volume loss. No extra-axial fluid collection.   Parenchyma: There is no diffusion restriction abnormality to suggest acute infarct.  Nonspecific T2/FLAIR hyperintense signal throughout the subcortical and periventricular white matter, similar to prior exam, which may be sequelae chronic small-vessel ischemic disease. Otherwise no abnormal parenchymal signal abnormalities. Redemonstration of encephalomalacia/gliosis involving right occipital lobe, similar to prior exam. There is no mass effect or midline shift. No abnormal areas of enhancement.   Paranasal Sinuses and Mastoids: Visualized paranasal sinuses and mastoid air cells are unremarkable.       1. No evidence of metastatic disease within the brain was identified. 2. Chronic findings as detailed above.   I personally reviewed the images/study and I agree with the findings as stated. This study was interpreted at [place], [place].   MACRO: None   Signed by: Pito Zepeda 1/9/2024 1:49 PM Dictation workstation:   [id number]    CT angio chest for pulmonary embolism    Result Date: 1/8/2024  Interpreted By:  Ruben Whitten, STUDY: CT ANGIO CHEST FOR PULMONARY EMBOLISM;   1/8/2024 5:50 pm   INDICATION: Signs/Symptoms:syncope, elevated dimer, also further elucidation of lung mass seen on c spine CT.   COMPARISON: None.   ACCESSION NUMBER(S): [id number]   ORDERING CLINICIAN: JOE ESQUIVEL   TECHNIQUE: Contiguous axial images of the chest were obtained after the intravenous administration of 60 mL Omnipaque 350 contrast using angiographic PE protocol. Coronal and sagittal reformatted images were reconstructed from the axial data. MIP images were created on an independent workstation and reviewed.   FINDINGS: PULMONARY ARTERIES: Adequate opacification to the level of the mid segmental arteries. The distal segmental and subsegmental arteries, particularly in the left upper lobe limited by mixing artifact and volume loss/consolidation. No filling defect to suggest pulmonary embolus in the visualized pulmonary arteries. The main pulmonary artery is normal in diameter. There is reflux of contrast into the hepatic venous circuit suggesting impaired right heart function.   HEART: Normal in size. Mild to moderate triple-vessel coronary vascular calcifications. No significant pericardial effusion.   VESSELS: Normal caliber aorta without dissection. Moderate atherosclerotic calcifications of the aortic arch and descending thoracic aorta.   MEDIASTINUM AND LYMPH NODES: Enlarged prevascular lymph nodes measuring up to 2.2 x 1.6 cm. The visualized thyroid is within normal limits. No pneumomediastinum. The esophagus appears within normal limits.   LUNG, AIRWAYS, AND PLEURA: The trachea and mainstem bronchi are patent. The left upper lobe bronchi are occluded proximally. Dense consolidation involving the majority of the left upper lobe with associated volume loss. There is a relatively low-density region centrally within the consolidation which appears to exert mass effect upon the adjacent bronchovascular structures, raising concern for underlying mass. Paraseptal and centrilobular emphysematous  changes in the lung apices. No pleural effusion or pneumothorax.   OSSEOUS STRUCTURES: No acute osseous abnormality.   CHEST WALL SOFT TISSUES: 1.2 cm nodule in the superficial subcutaneous soft tissues of the midline upper chest, possibly a sebaceous cyst. 0.8 cm nodule in the subcutaneous fat of the medial aspect of the left breast. Recommend mammographic correlation.   UPPER ABDOMEN/OTHER: Probable enlarged lymph node in the gastrohepatic ligament, suboptimally visualized. Diffuse thickening/enlargement of the bilateral adrenal glands concerning for adrenal metastases.       1. No evidence of pulmonary embolus to the level of the mid segmental arteries. The distal segmental arteries are suboptimally assessed as detailed above. 2. Dense left upper lobe consolidation with occlusion of the left upper lobe bronchi proximally. Areas of relatively low-density which appear to displace the adjacent bronchovascular structures within the central part of the consolidation raising concern for underlying mass. There is likely superimposed volume loss and postobstructive pneumonia. 3. Enlarged prevascular lymph nodes measuring up to 2.2 cm. Prominent right paratracheal nodes measuring up to 1 cm. 4. Partially visualized bilateral adrenal masses measuring at least 4.4 cm on the left and 2.8 cm in the right concerning for adrenal metastases. Recommend dedicated CT of the abdomen and pelvis. 5. A few scattered nodularities within the left breast, recommend mammographic correlation.   MACRO: Critical Finding:  See findings. Notification was initiated on 1/8/2024 at 6:21 pm by  Ruben Whitten.  (**-YCF-**) Instructions:   Signed by: Ruben Whitten 1/8/2024 6:22 PM Dictation workstation:   [id number]    XR chest 1 view    Result Date: 1/8/2024  Interpreted By:  Tobias Feliciano, STUDY: XR CHEST 1 VIEW  1/8/2024 3:21 pm   INDICATION: Signs/Symptoms:Chest Pain   COMPARISON: 06/22/2018   ACCESSION NUMBER(S): [id number]   ORDERING  CLINICIAN: JOE ESQUIVEL   TECHNIQUE: A single AP portable radiograph of the chest was obtained.   FINDINGS: Multiple cardiac monitoring leads are seen over the chest.  A large masslike opacity is seen over the left upper lung and may represent mass and/or pneumonia. No pneumothorax is identified. The cardiac silhouette is within normal limits for size.       Masslike opacity over the left upper lung, as above. Further evaluation with CT of the chest is recommended.   MACRO: None.   Signed by: Tobias Feliciano 1/8/2024 3:32 PM Dictation workstation:   [id number]    CT cervical spine wo IV contrast    Result Date: 1/8/2024  Interpreted By:  Tobias Feliciano, STUDY: CT CERVICAL SPINE WO IV CONTRAST;  1/8/2024 2:42 pm   INDICATION: Signs/Symptoms:syncope, fall with head trauma.   COMPARISON: None.   ACCESSION NUMBER(S): [id number]   ORDERING CLINICIAN: JOE ESQUIVEL   TECHNIQUE: Contiguous axial CT images were obtained at  2 mm slice thickness through the cervical spine without contrast administration. The images were then reconstructed in the coronal and sagittal planes.   FINDINGS: There is no CT evidence of acute fracture identified. No prevertebral soft tissue swelling is identified.   ALIGNMENT: The vertebral bodies are well aligned without evidence of subluxation.   VERTEBRAE/DISC SPACES: Moderate disc space narrowing and bridging osteophyte formation is seen at the C5-6 and C6-7 levels. Mild facet degenerative changes are seen throughout the cervical spine.   C2-3: There is no significant neural foraminal or central canal narrowing identified.   C3-4: There is no significant neural foraminal or central canal narrowing identified.   C4-5: There is no significant neural foraminal or central canal narrowing identified.   C5-6: Moderate to severe neural foraminal narrowing is seen bilaterally. No significant central canal narrowing is seen.   C6-7: Moderate to severe neural foraminal narrowing is seen bilaterally. No  significant central canal narrowing is seen.   C7-T1: Mild neural foraminal narrowing is seen bilaterally. No significant central canal narrowing is seen.   ADDITIONAL FINDINGS: Evaluation of the visualized soft tissues of the neck is limited by the lack of intravenous contrast. A masslike density is seen at the anterior aspect of the left upper lung. Dense atherosclerotic calcifications are seen in the carotid bulbs bilaterally.       1.  No CT evidence of acute fracture. 2.  Degenerative changes throughout the cervical spine, as above. 3. Masslike density in the left lung apex, as above. Further evaluation with CT of the chest is recommended.   Signed by: Tobias Feliciano 1/8/2024 3:06 PM Dictation workstation:   [id number]    CT head wo IV contrast    Result Date: 1/8/2024  Interpreted By:  Tobias Feliciano, STUDY: CT HEAD WO IV CONTRAST; 1/8/2024 2:42 pm   INDICATION: Signs/Symptoms:syncope, fall with head trauma.   COMPARISON: CT head dated 06/17/2018   ACCESSION NUMBER(S): [id number]   ORDERING CLINICIAN: JOE ESQUIVEL   TECHNIQUE: Contiguous axial CT images were obtained through the head at 5 mm slice thickness without contrast administration.   FINDINGS: INTRACRANIAL: Mild diffuse volume loss is seen bilaterally. Mild periventricular white matter changes are seen. Encephalomalacia is seen in the posterior right occipital lobe, consistent with prior infarct. The grey-white differentiation is otherwise intact. There is no mass effect or midline shift. There is no extraaxial fluid collection. There is no intracranial hemorrhage.  The calvarium  is unremarkable.   EXTRACRANIAL: Visualized paranasal sinuses and mastoids are clear.       1. Diffuse volume loss and periventricular white matter changes, most consistent with small vessel ischemic disease. 2. No evidence of acute cortical infarct or intracranial hemorrhage.     MACRO: None   Signed by: Tobias Feliciano 1/8/2024 3:02 PM Dictation workstation:   [id number]        Assessment/Plan     Claude is a 78 year old female with PMH of H. Flu meningitis, CAD s/p percutaneous coronary angioplasty, HDL, hypothyroidism, and anxiety presenting with chief complaint of syncope lasting for 45 seconds and collapse with head strike witnessed by grandson at home. Syncope likely due to severe bradycardia versus orthostatis. Last night, patient had an asymptomatic episode of bradycardia (low 40s on telemetry). Patient's orthostatics were negative. She also had CAD s/p inferior STEMI in 12/2021 with RCA stenting. She has preserved left ventricular systolic function and no ischemic symptoms. Patient has high suspicion of left upper lobe malignancy based on CTA chest. She will undergo staging and grading on outpatient basis. Syncope can also be due vagus nerve compression by the lung mass.      Principal Problem:    Mass of left lung  Active Problems:    Benign essential hypertension    CAD S/P percutaneous coronary angioplasty    Gastroesophageal reflux disease    Generalized anxiety disorder    #Syncope  #Bradycardia  #CAD s/p STEMI PCI/SAUMYA to RCA in 12/2021  -Patient had an asymptomatic episode of bradycardia (low 40s on telemetry), denying symptoms of SOB, lightheadedness, chest pain, or headache  -Patient has a baseline bradycardia-sinus rhythm  -TTE on 1/9 showed normal LV function, 65-70% EF, and RVSP within normal limits   -EP consulted and recommended to discharge patient home with a 2-week cardiac event monitor to document her rhythm with any additional symptomatic spells, and to establish chronotropic competence  -Continue telemetry to monitor rhythm   -Continue patient's home medications as appropriate     #Left upper lobe mass with apparent widely metastatic disease  #Bilateral adrenal masses  -Patient is asymptomatic. No respiratory distress on exam, with no supplemental oxygen requirement   -CT abdomen/pelvis showed numerous hepatic, bilateral adrenal, and pelvic/proximal femur  osteoblastic metastases   -Bronchoscopy (1/10) showed large fungating mass in L lobe obstructing airway. Biopsies x3 taken   -Today, patient was hypoxemic at 87% on room air. CXR was ordered showing no change in left hilar fullness with left upper lobe collapse and no new infiltrate or pneumothorax  -She will follow with Dr. Garcia outpatient. Adrenal biopsy via IR scheduled for 1//23. Aspirin will be held    #HTN  #HLD  -Continue patient's home medications as appropriate  -ASA 81mg held for upcoming adrenal gland biopsy via IR, scheduled on 1/23     #Hypothyroidism  -TSH 0.88  -We will continue patient's home Synthroid 75 mcg daily     #GERD  #General anxiety  -Will continue home meds as appropriate        Diet: Resume regular diet. Patient is s/p bronchoscopy  Telemetry  Maintenance fluids: LR  Consults: IR, pulmonology, and EP     Dispo: DC today       CODE STATUS: DNR CCA DNI        Filippo Gordon, MS3      I saw the patient with the above medical student and performed my own History and Physical Examination. My Subjective and Objective findings are reflected in the above documentation. The Assessment and Plan reflect my input. My Edits are included in the above documentation.     Discussed with attending,  Guero Welsh DO PGY-1  Internal Medicine

## 2024-01-12 NOTE — DISCHARGE SUMMARY
Discharge Diagnosis  Mass of left lung    Issues Requiring Follow-Up  Bradycardia  L upper lobe mass biopsy  Adrenal mass    Discharge Meds     Your medication list        CONTINUE taking these medications        Instructions Last Dose Given Next Dose Due   atorvastatin 80 mg tablet  Commonly known as: Lipitor           cholecalciferol 25 MCG (1000 UT) capsule  Commonly known as: Vitamin D-3           escitalopram 20 mg tablet  Commonly known as: Lexapro           famotidine 20 mg tablet  Commonly known as: Pepcid           ICaps AREDS 4,296 mcg-226 mg-90 mg capsule  Generic drug: vitamins A,C,E-zinc-copper           levothyroxine 50 mcg tablet  Commonly known as: Synthroid, Levoxyl           lisinopril 2.5 mg tablet           LORazepam 0.5 mg tablet  Commonly known as: Ativan           nitroglycerin 0.4 mg SL tablet  Commonly known as: Nitrostat                  STOP taking these medications      aspirin 81 mg EC tablet                 Test Results Pending At Discharge  Pending Labs       Order Current Status    Non-gynecologic cytology In process    Surgical Pathology Exam In process            Hospital Course  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 At the ED, CT spine/head showed no evidence of acute fracture acute cortical infarct or intracranial hemorrhage, and Chest x-ray and CTA showed dense left upper lobe consolidation. EKG showed sinus bradycardia, unchanged from baseline.    For her transient syncope, likely due to severe bradycardia or vagus nerve compression secondary to lung mass, patient will be discharged with a 2-week cardiac event monitor (per EP recommendation) to document her rhythm with any additional symptomatic  spells, and to establish chronotropic competence. TTE on 1/9 showed normal LV function, 65-70% EF, and RVSP within normal limits. Follow with EP outpatient.    For her left upper lobe mass, patient additionally had a CT abdomen/pelvis that showed numerous hepatic, bilateral adrenal, and pelvic/proximal femur osteoblastic metastases. MR brain w and wo IV contrast showed no evidence of metastatic disease within the brain. She had a bronchoscopy done on 1/10 that showed large fungating mass in left lobe obstructing airway and 3 biopsies were taken. She will follow with Dr. Garcia (Heme Onc) outpatient. Adrenal biopsy via IR scheduled for 1//23. Aspirin was discontinued.      Additionally, patient was hypoxemic today at 87% on room air. CXR was ordered showing no change in left hilar fullness with left upper lobe collapse and no new infiltrate or pneumothorax. Patient will follow up with pulmonology outpatient and will discharged home with oxygen therapy.    Continue patient home medications as appropriate for HTN, HLD, GERD, and general anxiety. Continue patient's home Synthroid 75 mcg daily for hypothyroidism.  Plan for patient to follow up with her PCP.  Patient to follow up with PCP, heme/onc, pulmonology, IR, and EP. Medically stable for discharge.    Pertinent Physical Exam At Time of Discharge  Physical Exam  Constitutional:       General: She is not in acute distress.     Appearance: Normal appearance. She is not ill-appearing.   HENT:      Head: Normocephalic and atraumatic.      Comments: Left occipital parietal hematoma, improved     Mouth/Throat:      Mouth: Mucous membranes are moist.   Cardiovascular:      Rate and Rhythm: Regular rhythm. Bradycardia present.      Pulses: Normal pulses.      Heart sounds: Normal heart sounds. No murmur heard.  Pulmonary:      Effort: Pulmonary effort is normal. No respiratory distress.      Breath sounds: Normal breath sounds. No wheezing.   Abdominal:      General: Abdomen  is flat. Bowel sounds are normal. There is no distension.      Palpations: Abdomen is soft.      Tenderness: There is no abdominal tenderness.   Skin:     General: Skin is warm and dry.   Neurological:      General: No focal deficit present.      Mental Status: She is alert and oriented to person, place, and time.      Outpatient Follow-Up  Future Appointments   Date Time Provider Department Center   2/9/2024  1:00 PM Tj Lambert MD [id number] [facility] DO [place]

## 2024-01-19 NOTE — DOCUMENTATION CLARIFICATION NOTE
"    PATIENT:               OPAL HUFF  ACCT #:                  [id number]  MRN:                       [id number]  :                       1945  ADMIT DATE:       2024 2:14 PM  DISCH DATE:        2024 6:26 PM  RESPONDING PROVIDER #:        [id number]          PROVIDER RESPONSE TEXT:    I concur with the pathology report findings and they are clinically significant    CDI QUERY TEXT:    [facility]_Path Results Simple    Instruction:    Based on your assessment of the patient and the clinical information, please provide the requested documentation by clicking on the appropriate radio button and enter any additional information if prompted.    Question: Please document whether you concur or do not concur with the pathology report findings    When answering this query, please exercise your independent professional judgment. The fact that a question is being asked, does not imply that any particular answer is desired or expected.    The patient's clinical indicators include:  Clinical Information: Admit with syncope and lung mass    Clinical Indicators and Pathology Findings:    Per path report from 1/10/24 at 1406: \"FINAL DIAGNOSIS A. LUNG, LEFT UPPER LOBE; BIOPSY:  -- SMALL CELL CARCINOMA.See comment\" and \"The tumor cells are positive for CAM5.2, CK AE1/AE3, synaptophysin, INSM1, and TTF1, while they are negative for p40 immunostain.Rb immunostain shows loss of nuclear expression in the tumor cells and Ki67 proliferative index is 95%.\"    Treatment: lung biopsy    Risk Factors: lung mass  Options provided:  -- I concur with the pathology report findings and they are clinically significant  -- I do not concur with the pathology report findings  -- Other - I will add my own diagnosis  -- Refer to Clinical Documentation Reviewer    Query created by: Dane Malave on 2024 9:23 AM      Electronically signed by:  CHRISTIE PATEL DO 2024 7:47 PM          "

## 2024-01-23 NOTE — Clinical Note
Left adrenal mass biopsy completed. Three passes obtained and specimen sent to lab for surgical pathology. Left side of lower back just left of midline dressed with 4x4 and tegaderm. Pt tolerated procedure well. Pt to have CXR 1 hour post procedure in recovery area.

## 2024-01-23 NOTE — POST-PROCEDURE NOTE
Interventional Radiology Brief Postprocedure Note    Attending: Rafael Magana MD    Assistant: none    Diagnosis: Adrenal masses, lung mass, liver masses    Description of procedure: Left adrenal mass biopsy core biopsy 18ga x3 with CT guidance. No immediate complications.    Anesthesia:  Moderate sedation    Complications: None    Estimated Blood Loss: minimal    Medications (Filter: Administrations occurring from 0807 to 0900 on 01/23/24) As of 01/23/24 0900      midazolam (Versed) injection (mg) Total dose:  1 mg      Date/Time Rate/Dose/Volume Action       01/23/24  0835 1 mg Given               fentaNYL PF (Sublimaze) injection (mcg) Total dose:  25 mcg      Date/Time Rate/Dose/Volume Action       01/23/24  0835 25 mcg Given                   ID Type Source Tests Collected by Time   1 : LEFT ADRENAL MASS BIOPSY Tissue ADRENAL BIOPSY LEFT SURGICAL PATHOLOGY EXAM Rafael Magana MD 1/23/2024 0846         See detailed result report with images in PACS.    The patient tolerated the procedure well without incident or complication and is in stable condition.

## 2024-01-23 NOTE — PRE-PROCEDURE NOTE
Interventional Radiology Preprocedure Note    Indication for procedure: Diagnoses of Mass of left lung and Malignant neoplasm metastatic to adrenal gland, unspecified laterality (CMS/HCC) were pertinent to this visit. Lung mass with bilateral adrenal gland masses concerning for metastasis. Patient presents for biopsy of the right OR left adrenal gland mass, to be determined at the time of procedure.    Relevant review of systems: NA    Relevant Labs:   Lab Results   Component Value Date    CREATININE 0.79 01/11/2024    EGFR 78 01/11/2024    INR 1.1 01/23/2024    PROTIME 12.2 01/23/2024       Planned Sedation/Anesthesia: Moderate    Airway assessment: normal    Directed physical examination:    Awake and alert, oriented  No acute distress  Regular rate, rhythm  Breathing non-labored      Mallampati: II (hard and soft palate, upper portion of tonsils anduvula visible)    ASA Score: ASA 3 - Patient with moderate systemic disease with functional limitations    Benefits, risks and alternatives of procedure and planned sedation have been discussed with the patient and/or their representative. All questions answered and they agree to proceed.

## 2024-01-23 NOTE — NURSING NOTE
Drsg to left adrenal gland biopsy site remains dry and intact. Out of bed w/minimal assist and without difficulty. Discharge instructions given and reviewed with patient and patient's daughter Sintia. Discharged to private vehicle via wheelchair, escorted by her daughter.

## 2024-01-31 NOTE — PROGRESS NOTES
Patient ID: Claude Monaco is a 78 y.o. female.  Referring Physician: Georgina Herrera MD  [id number]  Pulmonary Medicine, [facility] [address]  [place] [address]  Primary Care Provider: Rosalio Lee, DO      Subjective    HPI  Ms. Claude Monaco is a 77 y/o F presenting for initial consultation at the request of Dr. Mark Herrera, for small cell lung cancer.     Patient with a history of coronary disease, hyperlipidemia, hypothyroidism, anxiety and melanoma who was seen in hospital for syncope for 45 seconds after a fall. She underwent a CTA which showed a left upper lobe consolidation and then also had a CT A/P on 1/9/2024, which showed numerous hepatic, bilateral adrenal and pelvic/proximal femur osteoblastic metastases. MRI brain with and without contrast showed no evidence of disease. She did have a bronchoscopy on 1/10/2024, which showed a large fungating mass in the left lobe obstructing airway and 3 biopsies were performed which showed small cell carcinoma. Bronchial wash showed atypical cells. Patient also had a biopsy of the left adrenal which was consistent with metastatic small cell cancer. No initial PET CT yet. She presents today to discuss next steps.     Patient reports that she has a low appetite and has had an approximate 15-pound weight loss. She denies any pain currently. She reports that she is having some diarrhea but overall her bowel movements are regular. Denies any new chest pain. Patient states that she has cut back on smoking and now smokes approximately 5 cigarettes a day. No other complaints today.     Patient's past medical history, surgical history, family history and social history reviewed.     Objective   Vitals:    02/02/24 1507   BP: 137/79   Pulse: 68   Resp: 18   Temp: 36.7 °C (98.1 °F)   SpO2: 95%      Review of Systems:   Review of Systems:    Positive per HPI, otherwise negative.     Physical Exam:   Constitutional: Patient appears in no acute distress.    Sitting comfortably in chair.  Eyes: EOMI, clear sclera  ENMT: mucous membranes moist, no apparent injury  Head/Neck: Neck supple, no JVD  Respiratory/Thorax: Patent airways, CTAB, normal  breath sounds, no increased work of breathing  Cardiovascular: Regular, rate and rhythm, no murmurs  Gastrointestinal: Nondistended, soft, non-tender,  no rebound tenderness or guarding, no masses palpable  Extremities: normal extremities, no cyanosis edema,  no swelling  Neurological: alert and oriented x3, nonfocal, normal  speech and hearing  Lymphatic: No palpable lymphadenopathy in cervical,  axillary  lymph nodes.  Spleen appears normal size.  Psychological: Appropriate mood and behavior, normal  affect  Skin: Warm and dry, no lesions, no rashes     Performance Status:  Symptomatic; fully ambulatory    Labs/Imaging/Pathology: personally reviewed reports and images in Epic electronic medical record system. Pertinent results as it related to the plan represented in below in assessment and plan.      Assessment/Plan    1. Small cell lung cancer, extensive stage    - Today we will plan for a PET CT for baseline per patient preference.   - Reviewed CT C/A/P shows multiple metastases in bone, lung and liver. MRI brain is negative.   - She would like to avoid blood draws at this point.   - She is not sure ultimately if she wants to pursue any treatment, however we discussed standard of care treatment along with clinical trial which includes carboplatin, etoposide in combination with an experimental agent [id number] which is a fully humanized PD-L1 antibody which targets PD-L1 and VEGF-expressing tumors.  - At this point, patient feels overwhelmed about treatment decisions. Previously had thought she would not want to undergo treatment. Now is more open to it and would like to think about it over the weekend.  - In preparation, I will arrange an appointment with Dr. Shea on Monday  for consideration of trial and we will arrange for a PET  CT along with standard of care treatment first line carboplatin, etoposide   and atezolizumab to start Wednesday if she decides she does not want to proceed clinical trial.   - will send [facility] on D1 if she does end up agreeing to   - RTC TBD.     RTC TBD. This note has been transcribed using a medical scribe and there is a possibility of unintentional typing misprints.     Diagnoses and all orders for this visit:  Malignant neoplasm of upper lobe of left lung (CMS/HCC)  -     Referral to Hematology and Oncology  -     NM PET CT whole body; Future    Geovanna Kebede MD  Hematology/Oncology  [facility]    Scribe Attestation  By signing my name below, IMirian Scribe attest that this documentation has been prepared under the direction and in the presence of Geovanna Kebede MD.

## 2024-02-04 PROBLEM — C34.90 SMALL CELL LUNG CANCER (MULTI): Status: ACTIVE | Noted: 2024-01-01

## 2024-02-05 NOTE — PROGRESS NOTES
Patient ID: Claude Monaco is a 78 y.o. female.    Oncology History   Small cell lung cancer (CMS/HCC)   2/4/2024 Initial Diagnosis    Small cell lung cancer (CMS/HCC)     2/7/2024 -  Chemotherapy    Atezolizumab + CARBOplatin / Etoposide, 21 Day Cycles         Subjective    HPI  Ms. Claude Monaco is a 77 y/o F presenting follow-up for small cell lung cancer. She is here to review imaging from her PET CT.     PET CT does show FDG avid left upper lobe consolidative opacity extending to the left hilum and multiple FDG avid bilateral cervical and mediastinal lymph nodes and innumerable hypoattenuating lesions throughout the right and left hepatic lobes and bilateral adrenal glands. There is an FDG avid nodule in the left breast and innumerable FDG avid   bone lesions throughout the axial and appendicular skeleton along with enlarged right thyroid lobe with diffusely increased FDG uptake.     Since last visit, patient reports that she is feeling weaker and her appetite has decreased. States she is only eating maybe once a day. She reports that she has a fall yesterday. States that her macular degeneration is worsening. No other complaints today.     Patient's past medical history, surgical history, family history and social history reviewed.     Objective    Vitals:    02/06/24 1442   BP: 137/70   Pulse: 74   Resp: 18   Temp: 36.5 °C (97.7 °F)   SpO2: 94%       Review of Systems:   Review of Systems:    Positive per HPI, otherwise negative.     Physical Exam:   Constitutional: Patient appears in no acute distress.   Sitting comfortably in chair.  Eyes: EOMI, clear sclera  ENMT: mucous membranes moist, no apparent injury  Head/Neck: Neck supple, no JVD  Respiratory/Thorax: Patent airways, CTAB, normal  breath sounds, no increased work of breathing  Cardiovascular: Regular, rate and rhythm, no murmurs  Gastrointestinal: Nondistended, soft, non-tender,  no rebound tenderness or guarding, no masses palpable  Extremities:  normal extremities, no cyanosis edema,  no swelling  Neurological: alert and oriented x3, nonfocal, normal  speech and hearing  Lymphatic: No palpable lymphadenopathy in cervical,  axillary  lymph nodes.  Spleen appears normal size.  Psychological: Appropriate mood and behavior, normal  affect  Skin: Warm and dry, no lesions, no rashes     Performance Status:  Symptomatic; fully ambulatory    Labs/Imaging/Pathology: personally reviewed reports and images in Epic electronic medical record system. Pertinent results as it related to the plan represented in below in assessment and plan.     Assessment/Plan   1. Small cell lung cancer, extensive stage     - Today we will plan for a PET CT for baseline per patient preference.   - Reviewed CT C/A/P shows multiple metastases in bone, lung and liver. MRI brain is negative.   - She would like to avoid blood draws at this point.   - She is not sure ultimately if she wants to pursue any treatment, however we discussed standard of care treatment along with clinical trial which includes carboplatin, etoposide in combination with an experimental agent [id number] which is a fully humanized PD-L1 antibody which targets PD-L1 and VEGF-expressing tumors.  - At this point, patient feels overwhelmed about treatment decisions. Previously had thought she would not want to undergo treatment. Now is more open to it and would like to think about it over the weekend.  - In preparation, I will arrange an appointment with Dr. Shea on Monday  for consideration of trial and we will arrange for a PET CT along with standard of care treatment first line carboplatin, etoposide   and atezolizumab to start Wednesday if she decides she does not want to proceed clinical trial.   - will send [facility] on D1 if she does end up agreeing to   - [facility] TBD.     2/6/24:  - Reviewed imaging as patient wanted to see her PET CT before making a decision, however she decided over the weekend with her family she would not want  to pursue any systemic therapy.  - She states she is getting weaker and at this point does not want to risk any side effects even if she could have improvement in some of her symptoms due to the cancer.   - Her daughter who is in the healthcare field and grandson's girlfriend have also discussed with her and at this time they wish to pursue hospice alone.  - We will have our , Anu meet with patient. They are mostly likely interested in [facility].   - RTC as needed    RTC as needed. This note has been transcribed using a medical scribe and there is a possibility of unintentional typing misprints.      Diagnoses and all orders for this visit:  Small cell lung cancer (CMS/HCC)  -     Clinic Appointment Request  -     Disability Helen Kebede MD  Hematology/Oncology  [facility] at [facility]    Scribe Attestation  By signing my name below, CURT Mirian Angel Kenr attest that this documentation has been prepared under the direction and in the presence of Geovanna Kebede MD.

## 2024-02-05 NOTE — TELEPHONE ENCOUNTER
I spoke with Sintia about Claude's appointments. Sintia said that we can schedule the appointment for her first dose at 1000 but she would like to see images of her PET scan prior to that infusion and to talk with Dr. Kebede regarding treatment. I offered Tuesday at 2:40. She was appreciative and agreeable. All appointments were scheduled and she was appreciative.

## 2024-02-06 NOTE — PROGRESS NOTES
JUAN CARLOSW received referral from medical team stating that patient and family are interested in discussing hospice services.  JUAN CARLOSW met with patient and her daughters today after her appointment with Dr. Kebede.  Patient stating that she no longer wishes to pursue treatment and would like to remain at home with symptom management in place.  JUAN CARLOSW outlined philosophy of care with hospice services and how the patient and family would be supported at home with this team.  Patient currently living at home with her grandson and his girlfriend.  Patient's family lives within minutes of her home and appear to be a good support system.  Patient states that she does not want to suffer at home and is most concerned about how they will manage her pain and stomach issues.  She is open to meeting with a representative from hospice and is agreeable with a referral to [facility].  Referral placed and they will plan to reach out to patient's daughter, Sintia Agee ([phone]) this afternoon.  JUAN CARLOSW provided patient with my contact information as well as a handicap placard prescription per the patient's request.  No other issues or concerns noted at this time.  JUAN CARLOSW will continue to remain available.

## 2024-04-01 ENCOUNTER — APPOINTMENT (RX ONLY)
Dept: URBAN - METROPOLITAN AREA CLINIC 316 | Facility: CLINIC | Age: 79
Setting detail: DERMATOLOGY
End: 2024-04-01

## 2024-04-01 DIAGNOSIS — D485 NEOPLASM OF UNCERTAIN BEHAVIOR OF SKIN: ICD-10-CM | Status: INADEQUATELY CONTROLLED

## 2024-04-01 DIAGNOSIS — L82.1 OTHER SEBORRHEIC KERATOSIS: ICD-10-CM | Status: STABLE

## 2024-04-01 DIAGNOSIS — L57.0 ACTINIC KERATOSIS: ICD-10-CM | Status: INADEQUATELY CONTROLLED

## 2024-04-01 DIAGNOSIS — L81.4 OTHER MELANIN HYPERPIGMENTATION: ICD-10-CM | Status: STABLE

## 2024-04-01 PROBLEM — D48.5 NEOPLASM OF UNCERTAIN BEHAVIOR OF SKIN: Status: ACTIVE | Noted: 2024-04-01

## 2024-04-01 PROCEDURE — ? SUNSCREEN RECOMMENDATIONS

## 2024-04-01 PROCEDURE — 99213 OFFICE O/P EST LOW 20 MIN: CPT | Mod: 25

## 2024-04-01 PROCEDURE — ? BIOPSY BY SHAVE METHOD

## 2024-04-01 PROCEDURE — ? COUNSELING

## 2024-04-01 PROCEDURE — 11102 TANGNTL BX SKIN SINGLE LES: CPT

## 2024-04-01 PROCEDURE — ? PRESCRIPTION

## 2024-04-01 RX ORDER — FLUOROURACIL 5 MG/G
1 APP CREAM TOPICAL BID
Qty: 40 | Refills: 3 | Status: ERX | COMMUNITY
Start: 2024-04-01

## 2024-04-01 RX ADMIN — FLUOROURACIL 1 APP: 5 CREAM TOPICAL at 00:00

## 2024-04-01 ASSESSMENT — LOCATION SIMPLE DESCRIPTION DERM
LOCATION SIMPLE: RIGHT BACK
LOCATION SIMPLE: LEFT UPPER BACK
LOCATION SIMPLE: UPPER BACK
LOCATION SIMPLE: RIGHT ANTERIOR NECK
LOCATION SIMPLE: RIGHT SHOULDER
LOCATION SIMPLE: LEFT HAND
LOCATION SIMPLE: LEFT SHOULDER

## 2024-04-01 ASSESSMENT — LOCATION ZONE DERM
LOCATION ZONE: TRUNK
LOCATION ZONE: NECK
LOCATION ZONE: HAND
LOCATION ZONE: ARM

## 2024-04-01 ASSESSMENT — LOCATION DETAILED DESCRIPTION DERM
LOCATION DETAILED: RIGHT INFERIOR ANTERIOR NECK
LOCATION DETAILED: LEFT LATERAL UPPER BACK
LOCATION DETAILED: LEFT RADIAL DORSAL HAND
LOCATION DETAILED: RIGHT ANTERIOR SHOULDER
LOCATION DETAILED: RIGHT SUPERIOR LATERAL UPPER BACK
LOCATION DETAILED: LEFT ANTERIOR SHOULDER
LOCATION DETAILED: SUPERIOR THORACIC SPINE

## 2024-04-01 NOTE — PROCEDURE: BIOPSY BY SHAVE METHOD
Detail Level: Detailed
Depth Of Biopsy: dermis
Was A Bandage Applied: Yes
Size Of Lesion In Cm: 0
Biopsy Type: H and E
Biopsy Method: double edge Personna blade
Anesthesia Type: 2% lidocaine with epinephrine
Anesthesia Volume In Cc: 0.5
Hemostasis: Aluminum Chloride and Electrocautery
Wound Care: Bacitracin
Dressing: bandage
Destruction After The Procedure: No
Type Of Destruction Used: Curettage
Curettage Text: The wound bed was treated with curettage after the biopsy was performed.
Cryotherapy Text: The wound bed was treated with cryotherapy after the biopsy was performed.
Electrodesiccation Text: The wound bed was treated with electrodesiccation after the biopsy was performed.
Electrodesiccation And Curettage Text: The wound bed was treated with electrodesiccation and curettage after the biopsy was performed.
Silver Nitrate Text: The wound bed was treated with silver nitrate after the biopsy was performed.
Lab: 6
Lab Facility: 3
Triangulation (Location Of Lesion In Relation To Distance From Anatomical Landmarks): 1 CM PROX AND 1 CM MED TO LEFT HAND DORSAL RADIAL CREASE
Consent: Verbal consent was obtained and risks were reviewed including but not limited to scarring, infection, bleeding, scabbing, incomplete removal, nerve damage and allergy to anesthesia.
Post-Care Instructions: I reviewed with the patient in detail post-care instructions. Patient is to keep the biopsy site dry overnight, and then apply bacitracin twice daily until healed. Patient may apply hydrogen peroxide soaks to remove any crusting.
Notification Instructions: Patient will be notified of biopsy results. However, patient instructed to call the office if not contacted within 2 weeks.
Billing Type: Third-Party Bill
Information: Selecting Yes will display possible errors in your note based on the variables you have selected. This validation is only offered as a suggestion for you. PLEASE NOTE THAT THE VALIDATION TEXT WILL BE REMOVED WHEN YOU FINALIZE YOUR NOTE. IF YOU WANT TO FAX A PRELIMINARY NOTE YOU WILL NEED TO TOGGLE THIS TO 'NO' IF YOU DO NOT WANT IT IN YOUR FAXED NOTE.

## 2024-07-24 ENCOUNTER — APPOINTMENT (RX ONLY)
Dept: URBAN - METROPOLITAN AREA CLINIC 316 | Facility: CLINIC | Age: 79
Setting detail: DERMATOLOGY
End: 2024-07-24

## 2024-07-24 DIAGNOSIS — L81.4 OTHER MELANIN HYPERPIGMENTATION: ICD-10-CM | Status: STABLE

## 2024-07-24 DIAGNOSIS — L57.0 ACTINIC KERATOSIS: ICD-10-CM | Status: INADEQUATELY CONTROLLED

## 2024-07-24 DIAGNOSIS — L82.1 OTHER SEBORRHEIC KERATOSIS: ICD-10-CM | Status: STABLE

## 2024-07-24 PROCEDURE — ? COUNSELING

## 2024-07-24 PROCEDURE — ? LIQUID NITROGEN

## 2024-07-24 PROCEDURE — ? SUNSCREEN RECOMMENDATIONS

## 2024-07-24 PROCEDURE — 99213 OFFICE O/P EST LOW 20 MIN: CPT | Mod: 25

## 2024-07-24 PROCEDURE — 17000 DESTRUCT PREMALG LESION: CPT

## 2024-07-24 PROCEDURE — 17003 DESTRUCT PREMALG LES 2-14: CPT

## 2024-07-24 ASSESSMENT — LOCATION SIMPLE DESCRIPTION DERM
LOCATION SIMPLE: RIGHT EYEBROW
LOCATION SIMPLE: LEFT FOREARM
LOCATION SIMPLE: RIGHT FOREARM
LOCATION SIMPLE: RIGHT SHOULDER
LOCATION SIMPLE: RIGHT ANTERIOR NECK
LOCATION SIMPLE: RIGHT BACK
LOCATION SIMPLE: UPPER BACK
LOCATION SIMPLE: LEFT SHOULDER
LOCATION SIMPLE: LEFT UPPER BACK
LOCATION SIMPLE: SUPERIOR FOREHEAD

## 2024-07-24 ASSESSMENT — LOCATION DETAILED DESCRIPTION DERM
LOCATION DETAILED: RIGHT LATERAL EYEBROW
LOCATION DETAILED: LEFT PROXIMAL DORSAL FOREARM
LOCATION DETAILED: RIGHT DISTAL DORSAL FOREARM
LOCATION DETAILED: SUPERIOR MID FOREHEAD
LOCATION DETAILED: RIGHT INFERIOR ANTERIOR NECK
LOCATION DETAILED: LEFT LATERAL UPPER BACK
LOCATION DETAILED: SUPERIOR THORACIC SPINE
LOCATION DETAILED: LEFT DISTAL DORSAL FOREARM
LOCATION DETAILED: RIGHT PROXIMAL DORSAL FOREARM
LOCATION DETAILED: RIGHT SUPERIOR LATERAL UPPER BACK
LOCATION DETAILED: RIGHT ANTERIOR SHOULDER
LOCATION DETAILED: LEFT ANTERIOR SHOULDER

## 2024-07-24 ASSESSMENT — LOCATION ZONE DERM
LOCATION ZONE: NECK
LOCATION ZONE: FACE
LOCATION ZONE: ARM
LOCATION ZONE: TRUNK